# Patient Record
Sex: FEMALE | Race: WHITE | Employment: OTHER | URBAN - NONMETROPOLITAN AREA
[De-identification: names, ages, dates, MRNs, and addresses within clinical notes are randomized per-mention and may not be internally consistent; named-entity substitution may affect disease eponyms.]

---

## 2021-01-04 NOTE — PROGRESS NOTES
[facility] initial consultation note    Nivia Aguayo                                             Chief Complaint:Patient is here for Sleep consult, referred Beverly Betancourt, prior studies 2008, on pap     Chief complaint: Nivia Aguayo is a 77 y. o.oldfemale came for further evaluation regarding her sleep apnea  with referral from Ms. Alcira Baxter/ Dr. Chino Moreno    She was enrolled into Medicare 2 years back. See need BiPAP supplies. She need to see a sleep physician to get her supplies per her DME company. [place]:    Sleep/Wake schedule:  Usual time to go to bed during the work/regular day of week: 5:00 AM.  Usual time to wake up during the work//regular day of week: 4 to 8:00 P. M  She is working night shift in the past.  She is currently retired and staying at home. She is to sleep in the above sleep schedule throughout her life even during childhood. Over the weekends her sleep schedule: [x] Remain same. She usually falls a sleep in less than: 60minutes. She takes naps: Yes. Number of naps per week:  4 times. During each nap she spends a total of: 2hours. She is using her BiPAP device during her naps. The naps were reported as refreshing: No.     Sleep Hygiene:    Is the temperature and evironment in her bed room is acceptable to her: Yes. She watches Television in her bed room: No.  She read books, study, pay bills etc in the bed: Yes. She listen to books on tape. Frequency She wake up during night/sleep: 2  Majority of nocturnal awakenings are for urination: Yes. Difficulty in falling back to sleep after nocturnal awakenings: Yes    Do you drink coffee: No.   Do you drink caffeinated beverages i.e sodas: Yes. 8 can/s per day. [employer]epsi   Do you drink tea: Yes. Rarely I.e 2 times per year. Do you drink alcoholic beverages: Yes. Very rarely. History of recreational drug use: Yes.  She is currently on medical Marijuana treatment. History of tobacco smoking:Yes. Amount of tobacco smokin.0 PPD. Years of tobacco smokin. Quit smoking: No.             Quit year:No.    Current smoker: Yes. Amount of current tobacco smokin.0 PPD    Sleep apnea symptoms:  She was diagnosed with moderately severe obstructive sleep apnea diagnosed by baseline sleep study performed on 10 January 2007 (The baseline sleep study reports were not available for me to review. ). She is currently on treatment with an auto BiPAP with a maximum IPAP of 25 cm of water and minimum EPAP of 4 cm of water with pressure support of 4 cm of water. History of headaches in the morning:No.  History of dry mouth in the morning: Yes- some times  History of palpitations during night time/nocturnal awakenings: Yes- Occasionally. History of sweating during night time/nocturnal awakenings: No    General:  History of head injury in the past: No.   History of seizures: NO.  Rest less legs syndrome symptoms:NO  History suggestive of periodic limb movements during sleep: NO  History suggestive of hypnagogic hallucinations: NO  History suggestive of hypnopompic hallucinations: NO  History suggestive of sleep talking: NO  History suggestive of sleep walking:NO  History suggestive of bruxism: NO    History suggestive of cataplexy: NO  History suggestive of sleep paralysis:NO    Family history of sleep disorders:  Family history of obstructive sleep apnea: Yes. Family member diagnosed with obstructive sleep apnea brother. Family member using PAP therapy:  Yes. Family history of Narcolepsy: No.   Family history of Rest less legs syndrome : No.     History regarding old sleep studies:  Prior history of sleep study: Yes. Please see the diagnostic data section for details. Using CPAP device: Yes.   Currently using home Oxygen: NO.       Patient considerations:  Is the patient is ambulatory: Yes  Patient is currently using: None of these Wheelchair, [employer]over or [employer]. Para/Quadriplegic: NO  Hearing deficit : NO  Claustrophobic: NO  MDD : NO  Blind: NO  Incontinent: NO  Para/Quadraplegi: NO.   Need transportation to and from Sleep Center:NO    Social History:  Social History     Tobacco Use    Smoking status: Current Every Day Smoker     Packs/day: 2.00     Years: 50.00     Pack years: 100.00     Types: Cigarettes     Start date: 1971    Smokeless tobacco: Never Used    Tobacco comment: has cut down within last month to 1 pack   Substance Use Topics    Alcohol use: Yes     Frequency: Monthly or less     Drinks per session: 10 or more     Binge frequency: Less than monthly     Comment: ocassional    Drug use: Yes     Types: Marijuana     Comment: STATES IT IS MEDICAL   . She is currently working: No.       [x] Retired.                           Past Medical History:   Diagnosis Date    Allergic rhinitis     Anxiety     CAD (coronary artery disease)     COPD (chronic obstructive pulmonary disease) (HCC)     Cough     Depression     GERD (gastroesophageal reflux disease)     Hypercholesteremia     Hyperlipidemia     Hypertension     benign    Hypothyroidism     Irritable bowel syndrome     Myalgia and myositis     Sinusitis, acute maxillary     Tobacco use disorder     Type II or unspecified type diabetes mellitus without mention of complication, not stated as uncontrolled     Unspecified sleep apnea        Past Surgical History:   Procedure Laterality Date    CHOLECYSTECTOMY      COLONOSCOPY  2019    CORONARY ARTERY BYPASS GRAFT      EGD  [phone]    TONSILLECTOMY         Allergies   Allergen Reactions    Keflex [Cephalexin] Hives    Pcn [Penicillins]     Plavix [Clopidogrel Bisulfate]     Ticlid [Ticlopidine Hydrochloride]     Bactrim [Sulfamethoxazole-Trimethoprim] Rash       Current Outpatient Medications   Medication Sig Dispense Refill    valsartan (DIOVAN) 320 MG tablet Take 320 mg by mouth daily      furosemide (LASIX) 20 MG tablet Take 20 mg by mouth Twice a Week      magnesium oxide (MAG-OX) 400 MG tablet Take 400 mg by mouth daily      vitamin B-12 (CYANOCOBALAMIN) 1000 MCG tablet Take 1,000 mcg by mouth daily      Cholecalciferol (VITAMIN D3) 1.25 MG (69275 UT) CAPS Take by mouth      medical marijuana Take by mouth as needed.  Exenatide (BYDUREON) 2 MG PEN Inject 2 mg into the skin once a week       Multiple Vitamins-Minerals (MULTIVITAMIN & MINERAL PO) Take  by mouth.  Glucosamine-Chondroit-Vit C-Mn (GLUCOSAMINE CHONDR 1500 COMPLX) CAPS Take  by mouth.  DULoxetine (CYMBALTA) 30 MG capsule Take 60 mg by mouth daily       glimepiride (AMARYL) 2 MG tablet Take 4 mg by mouth every morning (before breakfast)       metformin (GLUCOPHAGE) 500 MG tablet Take 1,000 mg by mouth 2 times daily (with meals)       rosuvastatin (CRESTOR) 20 MG tablet Take 20 mg by mouth daily.  metoprolol (TOPROL-XL) 50 MG XL tablet Take 50 mg by mouth daily.  isosorbide mononitrate (IMDUR) 60 MG CR tablet Take 60 mg by mouth daily.  Pantoprazole Sodium (PROTONIX) 40 MG PACK packet Take 1 packet by mouth daily.  levothyroxine (SYNTHROID) 137 MCG tablet Take 125 mcg by mouth daily       aspirin 325 MG tablet Take 325 mg by mouth daily.  amLODIPine (NORVASC) 10 MG tablet Take 10 mg by mouth daily      losartan (COZAAR) 50 MG tablet Take 50 mg by mouth daily.  losartan (COZAAR) 25 MG tablet Take 1 tablet by mouth daily for 180 days. 30 tablet 0    nitroGLYCERIN (NITROSTAT) 0.4 MG SL tablet Place 0.4 mg under the tongue every 5 minutes as needed. No current facility-administered medications for this visit.         Family History   Problem Relation Age of Onset    Diabetes Mother     High Blood Pressure Father     Diabetes Maternal Grandmother     Cancer Paternal Grandmother         ovarian    Colon Cancer Neg Hx     Rectal Cancer Neg Hx     Esophageal Cancer Neg Hx     Stomach Cancer Neg Hx         Review of Systems:    General/Constitutional: She lost approximately 16 pounds of weight from her last titration study performed on 8 July 2009 with normal appetit. No fever or chills. HENT: Negative. Eyes: Negative. Upper respiratory tract: No nasal stuffiness or post nasal drip. Lower respiratory tract/ lungs: Occasional cough with clear sputum production. No hemoptysis. Cardiovascular: No palpitations or chest pain. Gastrointestinal: No nausea or vomiting. Neurological: No focal neurologiacal weakness. Extremities: No edema. Musculoskeletal: No complaints. Genitourinary: No complaints. Hematological: Negative. Psychiatric/Behavioral: Negative. Skin: No itching. /64 (Site: Left Upper Arm, Position: Sitting, Cuff Size: Medium Adult)   Pulse 74   Temp 97.6 °F (36.4 °C) (Temporal)   Ht 5' 5\" (1.651 m)   Wt 169 lb (76.7 kg)   SpO2 99% Comment: r/a  BMI 28.12 kg/m²   Mallampati airway Class:IV  Neck Circumference:15.5 Inches  [place] sleepiness score 1/21/21: 6  ( On further questioning she gives a history of hypersomnia ( Excessive daytime sleepiness) with daytime sleepy attacks. No reported motor vehicle accidents due to her sleepiness). SAQLI: 43     Physical Exam   Nursing note and vitals reviewed. Constitutional: Patient appears moderately built and moderately nourished. No distress. Patient is oriented to person, place, and time. HENT:   Head: Normocephalic and atraumatic. Right Ear: External ear normal.   Left Ear: External ear normal.   Mouth/Throat: Oropharynx is clear and moist.  No oral thrush. Eyes: Conjunctivae are normal. Pupils are equal, round, and reactive to light. No scleral icterus. Neck: Neck supple. No JVD present. No tracheal deviation present. Cardiovascular: Normal rate, regular rhythm, normal heart sounds. No murmur heard. Pulmonary/Chest: Effort normal and breath sounds normal. No stridor.  No respiratory of 44 years.  -Chronic history of tobacco smoking. She still smoking 2 packs of cigarettes per day. -COPD of uncertain severity.  -Depression.  -Type 2 diabetes mellitus. .  -She is working night shift in the past.    -She is currently on treatment with medical marijuana for her chronic back pain  -Anxiety disorder. Recommendations/Plan:  -Schedule patient for nocturnal polysomnogram (Sleep study) with split night protocol at [facility]  sleep lab to diagnose sleep apnea and to get optimal pressure I.e CPAP or BiPAP to start/continue PAP therapy. Patient to follow with my clinic at [facility] sleep clinic in 6 to 8 weeks with CPAP download for further evaluation.  -I had a discussion with patient regarding avialable treatment options for her sleep disorder breathing including but not limited to CPAP titration in the sleep lab Vs.Dental appliance placement with referral to a local dentist Vs other available surgical options including Uvulopalatopharyngoplasty, maxillomandibular ostomy and tracheostomy as last option. At the end of discussion, she decided on CPAP/BiPAP/AutoSV as a treatment if she found to have obstructive sleep apnea during above test/study.  -Patient educated to quit smoking as soon as possible. Patient verbalizes understanding of adverse consequences of tobacco smoking.  -She was advised to continue current positive airway pressure therapy with current pressure settings until she had new pressure settings were available. -She advised to keep good compliance with current recommended pressure to get optimal results and clinical improvement.  -Schedule patient for chest X-ray PA and Lateral views in 1month to evaluate her lung fields due to hx of tobacco smoking. Chest Xray need to be done 2days before clinic visit.  -Schedule patient for full pulmonary function tests before clinic visit.   -Will schedule patient for follow with my pulmonary clinic or any available provider at center for pulmonary disease with above Chest Xray and PFTS to further evaluate and manage COPD in 1month.  -Follow with my clinic in 6 to 8 weeks for clinical reevaluation with review of download. -She was advised to call [employer] regarding supplies if needed.  -She was advised to practice good sleep hygiene techniques. She was provided with a hand out. -She was advised call my office for earlier appointment if needed for worsening of sleep symptoms.  -Jaleesa Mcmahon educated about my impression and plan. Patient verbalizes understanding.      -I personally reviewed updated the Past medical hx, Past surgical hx,Social hx, Family hx, Medications, Allergies in the discrete data section of the patient chart along with labs, Pulmonary medicine,Sleep medicine related, Pathological, Microbiological and Radiological investigations.

## 2021-01-21 ENCOUNTER — INITIAL CONSULT (OUTPATIENT)
Dept: PULMONOLOGY | Age: 66
End: 2021-01-21
Payer: MEDICARE

## 2021-01-21 VITALS
BODY MASS INDEX: 28.16 KG/M2 | WEIGHT: 169 LBS | HEART RATE: 74 BPM | OXYGEN SATURATION: 99 % | HEIGHT: 65 IN | SYSTOLIC BLOOD PRESSURE: 116 MMHG | TEMPERATURE: 97.6 F | DIASTOLIC BLOOD PRESSURE: 64 MMHG

## 2021-01-21 DIAGNOSIS — G47.30 SLEEP APNEA, UNSPECIFIED TYPE: ICD-10-CM

## 2021-01-21 DIAGNOSIS — I25.10 CORONARY ARTERY DISEASE INVOLVING NATIVE CORONARY ARTERY OF NATIVE HEART WITHOUT ANGINA PECTORIS: ICD-10-CM

## 2021-01-21 DIAGNOSIS — Z72.0 TOBACCO ABUSE: Primary | ICD-10-CM

## 2021-01-21 DIAGNOSIS — R06.09 EXERTIONAL DYSPNEA: ICD-10-CM

## 2021-01-21 DIAGNOSIS — I10 ESSENTIAL HYPERTENSION: ICD-10-CM

## 2021-01-21 PROCEDURE — 3017F COLORECTAL CA SCREEN DOC REV: CPT | Performed by: INTERNAL MEDICINE

## 2021-01-21 PROCEDURE — G8427 DOCREV CUR MEDS BY ELIG CLIN: HCPCS | Performed by: INTERNAL MEDICINE

## 2021-01-21 PROCEDURE — G8484 FLU IMMUNIZE NO ADMIN: HCPCS | Performed by: INTERNAL MEDICINE

## 2021-01-21 PROCEDURE — G8400 PT W/DXA NO RESULTS DOC: HCPCS | Performed by: INTERNAL MEDICINE

## 2021-01-21 PROCEDURE — G8417 CALC BMI ABV UP PARAM F/U: HCPCS | Performed by: INTERNAL MEDICINE

## 2021-01-21 PROCEDURE — 4040F PNEUMOC VAC/ADMIN/RCVD: CPT | Performed by: INTERNAL MEDICINE

## 2021-01-21 PROCEDURE — 1090F PRES/ABSN URINE INCON ASSESS: CPT | Performed by: INTERNAL MEDICINE

## 2021-01-21 PROCEDURE — 99204 OFFICE O/P NEW MOD 45 MIN: CPT | Performed by: INTERNAL MEDICINE

## 2021-01-21 PROCEDURE — 4004F PT TOBACCO SCREEN RCVD TLK: CPT | Performed by: INTERNAL MEDICINE

## 2021-01-21 PROCEDURE — 1123F ACP DISCUSS/DSCN MKR DOCD: CPT | Performed by: INTERNAL MEDICINE

## 2021-01-21 RX ORDER — VALSARTAN 320 MG/1
320 TABLET ORAL DAILY
COMMUNITY

## 2021-01-21 RX ORDER — CHOLECALCIFEROL (VITAMIN D3) 1250 MCG
CAPSULE ORAL
COMMUNITY
End: 2022-03-11

## 2021-01-21 RX ORDER — LANOLIN ALCOHOL/MO/W.PET/CERES
1000 CREAM (GRAM) TOPICAL DAILY
COMMUNITY
End: 2022-03-11

## 2021-01-21 RX ORDER — FUROSEMIDE 20 MG/1
20 TABLET ORAL
COMMUNITY
End: 2022-03-11

## 2021-01-21 RX ORDER — MAGNESIUM OXIDE 400 MG/1
400 TABLET ORAL DAILY
COMMUNITY

## 2021-01-21 SDOH — HEALTH STABILITY: MENTAL HEALTH: HOW MANY STANDARD DRINKS CONTAINING ALCOHOL DO YOU HAVE ON A TYPICAL DAY?: 10 OR MORE

## 2021-01-21 NOTE — PATIENT INSTRUCTIONS
Patient Education        Learning About Benefits From Quitting Smoking  How does quitting smoking make you healthier? If you're thinking about quitting smoking, you may have a few reasons to be smoke-free. Your health may be one of them. · When you quit smoking, you lower your risks for cancer, lung disease, heart attack, stroke, blood vessel disease, and blindness from macular degeneration. · When you're smoke-free, you get sick less often, and you heal faster. You are less likely to get colds, flu, bronchitis, and pneumonia. · As a nonsmoker, you may find that your mood is better and you are less stressed. When and how will you feel healthier? Quitting has real health benefits that start from day 1 of being smoke-free. And the longer you stay smoke-free, the healthier you get and the better you feel. The first hours  · After just 20 minutes, your blood pressure and heart rate go down. That means there's less stress on your heart and blood vessels. · Within 12 hours, the level of carbon monoxide in your blood drops back to normal. That makes room for more oxygen. With more oxygen in your body, you may notice that you have more energy than when you smoked. After 2 weeks  · Your lungs start to work better. · Your risk of heart attack starts to drop. After 1 month  · When your lungs are clear, you cough less and breathe deeper, so it's easier to be active. · Your sense of taste and smell return. That means you can enjoy food more than you have since you started smoking. Over the years  · Over the years, your risks of heart disease, heart attack, and stroke are lower. · After 10 years, your risk of dying from lung cancer is cut by about half. And your risk for many other types of cancer is lower too. How would quitting help others in your life? When you quit smoking, you improve the health of everyone who now breathes in your smoke.   · Their heart, lung, and cancer risks drop, much like yours.  · They are sick less. For babies and small children, living smoke-free means they're less likely to have ear infections, pneumonia, and bronchitis. · If you're a woman who is or will be pregnant someday, quitting smoking means a healthier . · Children who are close to you are less likely to become adult smokers. Where can you learn more? Go to https://[employer] TechnologypepicewAgrar33.[employer] [facility]. org and sign in to your [employer] account. Enter [phone] in the myWebRoom box to learn more about \"Learning About Benefits From Quitting Smoking. \"     If you do not have an account, please click on the \"Sign Up Now\" link. Current as of: 2020               Content Version: 12.6  © 1550-3890 [employer]. Care instructions adapted under license by [facility] ([facility]). If you have questions about a medical condition or this instruction, always ask your healthcare professional. [place] [address] any warranty or liability for your use of this information. Recommendations/Plan:  -Schedule patient for nocturnal polysomnogram (Sleep study) with split night protocol at [facility]  sleep lab to diagnose sleep apnea and to get optimal pressure I.e CPAP or BiPAP to start/continue PAP therapy. Patient to follow with my clinic at [facility] sleep clinic in 6 to 8 weeks with CPAP download for further evaluation.  -I had a discussion with patient regarding avialable treatment options for her sleep disorder breathing including but not limited to CPAP titration in the sleep lab Vs.Dental appliance placement with referral to a local dentist Vs other available surgical options including Uvulopalatopharyngoplasty, maxillomandibular ostomy and tracheostomy as last option.  At the end of discussion, she decided on CPAP/BiPAP/AutoSV as a treatment if she found to have obstructive sleep apnea during above test/study.  -Patient educated to quit smoking as soon as possible. Patient verbalizes understanding of adverse consequences of tobacco smoking.  -She was advised to continue current positive airway pressure therapy with current pressure settings until she had new pressure settings were available. -She advised to keep good compliance with current recommended pressure to get optimal results and clinical improvement.  -Schedule patient for chest X-ray PA and Lateral views in 1month to evaluate her lung fields due to hx of tobacco smoking. Chest Xray need to be done 2days before clinic visit.  -Schedule patient for full pulmonary function tests before clinic visit. -Will schedule patient for follow with my pulmonary clinic or any available provider at center for pulmonary disease with above Chest Xray and PFTS to further evaluate and manage COPD in 1month.  -Follow with my clinic in 6 to 8 weeks for clinical reevaluation with review of download. -She was advised to call [employer] regarding supplies if needed.  -She was advised to practice good sleep hygiene techniques. She was provided with a hand out. -She was advised call my office for earlier appointment if needed for worsening of sleep symptoms.  -Kash Wilson educated about my impression and plan. Patient verbalizes understanding.

## 2021-02-18 NOTE — PROGRESS NOTES
[facility] [address]  Pulmonary medicine clinic follow up note Cheryl Cook is an established patient of my sleep medicine clinic at [facility]. She came for Pulmonary medicine evaluation today. She was seen by me for the last time on 2021      Patient: Jaleesa Mcmahon  : 1955      Chief complaint/[place]:  Jaleesa Mcmahon is a 77 y.o. old female came for further evaluation regarding her dyspnea with after having a requested chest x-ray PA and lateral views and full pulmonary function tests. She is having shortness of breath: Yes  Onset: gradual   Duration: few years  Diurnal variation:  None. Current functional capacity on level ground:<1 block on level ground. She can climb steps: Yes  Flights of steps she can climb: 1  She denies orthopnea. She denies paroxysmal nocturnal dyspnea. She is currently using BiPAP machine at nighttime. She is having cough: Yes  Duration of cough: for last 20years . Her cough is associated with sputum production: Yes   The sputum color: white  Hemoptysis:No  Diurnal variation: None. She is having chest pain:Yes- rarely. She follows with Dr. Jayda Alfred MD-her cardiologist.  She takes nitro tablets on as needed basis for her chest pain sublingually. He denies any chest pain    She is currently not using any oxygen supplementation at rest, exercise or during sleep/at night time. She was never diagnosed with pulmonary diseases I.e bronchial asthma, COPD,Pulmonary fibrosis, Sarcoidosis, pulmonary embolism,pulmonary hypertension or pleural effusion/s in the past.    She denies any hemoptysis. She was never diagnosed with pulmonary tuberculosis in the past. She denies any recent exposure to any patients with tuberculosis. She denies any recent travel to endemic places of tuberculosis.      Wells Score:    Sign/Symptom:                  Score:    Symptoms of  DVT :    0.       (3) Medical History:   Diagnosis Date    Allergic rhinitis     Anxiety     CAD (coronary artery disease)     COPD (chronic obstructive pulmonary disease) (HCC)     Cough     Depression     GERD (gastroesophageal reflux disease)     Hypercholesteremia     Hyperlipidemia     Hypertension     benign    Hypothyroidism     Irritable bowel syndrome     Myalgia and myositis     Sinusitis, acute maxillary     Tobacco use disorder     Type II or unspecified type diabetes mellitus without mention of complication, not stated as uncontrolled     Unspecified sleep apnea        Past Surgical History:   Procedure Laterality Date    CHOLECYSTECTOMY      COLONOSCOPY  2019    CORONARY ARTERY BYPASS GRAFT      EGD  [id number]    TONSILLECTOMY         Social History     Tobacco Use    Smoking status: Current Every Day Smoker     Packs/day: 2.00     Years: 50.00     Pack years: 100.00     Types: Cigarettes     Start date: 24    Smokeless tobacco: Never Used    Tobacco comment: has cut down within last month to 1 pack   Substance Use Topics    Alcohol use: Yes     Frequency: Monthly or less     Drinks per session: 10 or more     Binge frequency: Less than monthly     Comment: ocassional    Drug use: Yes     Types: Marijuana     Comment: STATES IT IS MEDICAL       Allergies   Allergen Reactions    Keflex [Cephalexin] Hives    Pcn [Penicillins]     Plavix [Clopidogrel Bisulfate]     Ticlid [Ticlopidine Hydrochloride]     Bactrim [Sulfamethoxazole-Trimethoprim] Rash       Current Outpatient Medications   Medication Sig Dispense Refill    CEPHALEXIN PO Take by mouth      valsartan (DIOVAN) 320 MG tablet Take 320 mg by mouth daily      furosemide (LASIX) 20 MG tablet Take 20 mg by mouth Twice a Week      magnesium oxide (MAG-OX) 400 MG tablet Take 400 mg by mouth daily      vitamin B-12 (CYANOCOBALAMIN) 1000 MCG tablet Take 1,000 mcg by mouth daily      Cholecalciferol (VITAMIN D3) 1.25 MG (17231 UT) CAPS airway Class:IV  Neck Circumference:15.5 Inches    Nursing note and vitals reviewed. Constitutional: Patient appears moderately built and moderately nourished. No distress. Patient is oriented to person, place, and time. HENT:   Head: Normocephalic and atraumatic. Right Ear: External ear normal.   Left Ear: External ear normal.   Mouth/Throat: Oropharynx is clear and moist.  No oral thrush. Eyes: Conjunctivae are normal. Pupils are equal, round, and reactive to light. No scleral icterus. Neck: Neck supple. No JVD present. No tracheal deviation present. Cardiovascular: Normal rate, regular rhythm, normal heart sounds. No murmur heard. Pulmonary/Chest: Effort normal and breath sounds normal. No stridor. No respiratory distress. No wheezes. No rales. Patient exhibits no tenderness. Abdominal: Soft. Patient exhibits no distension. No tenderness. Musculoskeletal: Normal range of motion. Extremities: Patient exhibits no edema and no tenderness. Lymphadenopathy:  No cervical adenopathy. Neurological: Patient is alert and oriented to person, place, and time. Skin: Skin is warm and dry. Patient is not diaphoretic. Psychiatric: Patient  has a normal mood and affect. Patient behavior is normal.       Diagnostic Data:    Sleep test: Performed on 1/10/2007  Not available for me to review. CPAP titration study: 07/8/2009            Down load in 2011:      Diagnostic Data: PSG/CPAP: 7/8/2009  PAP Download: [place]   Recorded compliance dates:12.21.20-1.19.21  More than 4hour usage compliance was:100%. Average residual Apnea- Hypoapnea index on current pressue was:4.5.       PAP Type VAuto   Level  25-4-4cmH2O     Average usuage hours per day was:10hrs 30min       Interface: ffm      Provider:  []?[facility]             [x]? [facility]                        []?[facility]          []? [facility]                           []?[facility]      []? Other:     95th percentile leaks: 10.8 L/min.     Split-night sleep study performed on 5 February 2021 at [facility] sleep lab:                          Chest Xray: 03/2/2021      The above radiological study films were reviewed by me. PFTS: 03/2/2021          Split-night sleep study performed at [facility] hospital: 2/5/2021                                                    Assessment:  --Exertional dyspnea due to ? Etiology. Differential includes Pulmonary Vs Cardiac.  -Coronary artery disease status post multiple stents in place  -Moderately severe obstructive sleep apnea. She is currently on treatment with the BiPAP pressure of 17/11 centimeters of water.  -Hypersomnia ( Excessive daytime sleepiness) may be due to obstructive sleep apnea Vs Inadequate sleep hygiene Vs her Medical Marijuana.  -Inadequate sleep hygiene.   -Status post CABG at the age of 38 years.  -Chronic history of tobacco smoking. She still smoking 2 packs of cigarettes per day. -Depression.  -Type 2 diabetes mellitus. .  -She is working night shift in the past.    -She is currently on treatment with medical marijuana for her chronic back pain  -Anxiety disorder. Recommendations/Plan:  -Patient educated to quit smoking as soon as possible. Patient verbalizes understanding of adverse consequences of tobacco smoking.  -Advised to keep her scheduled follow-up with Dr. Anatoliy Najera MD regarding her carotid artery disease and intermittent chest pain management.  -Will send serum D- dimer today to evaluate for etiology of exertional dyspnea. She was advised and instructed to call my office in [facility] to go over the above test results and management.  She verbalizes understanding.  -Schedule patient for Methacholine challenge test before clinic visit  to further evaluate for hyperreactive air way disease before clinic visit- to be done at [address] [place] [address].  -Schedule patient for low dose CT scan of chest with out IV contrast as recommended by the  U.S. Preventive Services Task Force and the NCCN for lung Cancer Screening as soon as possible. - Schedule patient for follow up with my clinic in 1month with recommended test/s Low dose CT chest and MCCT test. Patient advised to make early appointment if needed.     -She was advised to continue current positive airway pressure therapy with current pressure settings until she had new pressure settings were available. -She advised to keep good compliance with current recommended pressure to get optimal results and clinical improvement.  -Ashley Pedraza was advised  to keep her scheduled follow up at [facility] for pulmonary disease) sleep clinic for further evaluation and management of sleep apnea with down load. -She was advised to call [employer] regarding supplies if needed.  -She was advised to practice good sleep hygiene techniques. She was provided with a hand out. -She was advised call my office for earlier appointment if needed for worsening of sleep symptoms.  -She was advised to continue to practice good sleep hygiene practices.  -She was advised to loose weight by controlling diet and doing exercise once cleared by her family physician.   -Ashley Pedraza educated about my impression and plan. Patient verbalizes understanding.      -I personally reviewed updated the Past medical hx, Past surgical hx,Social hx, Family hx, Medications, Allergies in the discrete data section of the patient chart along with labs, Pulmonary medicine,Sleep medicine related, Pathological, Microbiological and Radiological investigations. Low Dose CT (LDCT) Lung Screening criteria met   Age 50-69   Pack year smoking >30   Still smoking or less than 15 year since quit   No sign or symptoms of lung cancer   > 11 months since last LDCT     Risks and benefits of lung cancer screening with LDCT scans discussed:    Significance of positive screen - False-positive LDCT results often occur. 95% of all positive results do not lead to a diagnosis of cancer.  Usually further imaging can resolve most false-positive results; however, some patients may require invasive procedures. Over diagnosis risk - 10% to 12% of screen-detected lung cancer cases are over diagnosed--that is, the cancer would not have been detected in the patient's lifetime without the screening. Need for follow up screens annually to continue lung cancer screening effectiveness     Risks associated with radiation from annual LDCT- Radiation exposure is about the same as for a mammogram, which is about 1/3 of the annual background radiation exposure from everyday life. Starting screening at age 54 is not likely to increase cancer risk from radiation exposure. Patients with comorbidities resulting in life expectancy of < 10 years, or that would preclude treatment of an abnormality identified on CT, should not be screened due to lack of benefit. To obtain maximal benefit from this screening, smoking cessation and long-term abstinence from smoking is critical    Addendum done on 3/11/21 at 7:00 PM EST:  I was notified by my office that the patient D-dimer sent today on 11 March 2021 was a 0.77 ( the normal levels are 0.19 to 0.52 mg/L)-elevated. Plan:  -Please schedule patient for CTA (CT angiogram of chest ) with IV contrast on stat basis to evaluate for Pulmonary embolism due to elevated D- dimer. She need to be kept in radiology dept until the test results were available. She can be discharged if the CTA of chest is negative for pulmonary embolism. How ever if the CTA of chest is positive for pulmonary embolism, she need to be kept in radiology dept and the results need to be called to me or our office.  -We will cancel a low-dose CT scan of chest ordered today.  -Please keep current scheduled appointment with my clinic.

## 2021-03-03 DIAGNOSIS — G47.30 SLEEP APNEA, UNSPECIFIED TYPE: ICD-10-CM

## 2021-03-03 DIAGNOSIS — I25.10 CORONARY ARTERY DISEASE INVOLVING NATIVE CORONARY ARTERY OF NATIVE HEART WITHOUT ANGINA PECTORIS: ICD-10-CM

## 2021-03-03 DIAGNOSIS — Z72.0 TOBACCO ABUSE: ICD-10-CM

## 2021-03-03 DIAGNOSIS — R06.09 EXERTIONAL DYSPNEA: ICD-10-CM

## 2021-03-03 DIAGNOSIS — I10 ESSENTIAL HYPERTENSION: ICD-10-CM

## 2021-03-08 DIAGNOSIS — R06.09 EXERTIONAL DYSPNEA: ICD-10-CM

## 2021-03-08 DIAGNOSIS — I10 ESSENTIAL HYPERTENSION: ICD-10-CM

## 2021-03-08 DIAGNOSIS — G47.30 SLEEP APNEA, UNSPECIFIED TYPE: ICD-10-CM

## 2021-03-08 DIAGNOSIS — Z72.0 TOBACCO ABUSE: ICD-10-CM

## 2021-03-08 DIAGNOSIS — I25.10 CORONARY ARTERY DISEASE INVOLVING NATIVE CORONARY ARTERY OF NATIVE HEART WITHOUT ANGINA PECTORIS: ICD-10-CM

## 2021-03-11 ENCOUNTER — OFFICE VISIT (OUTPATIENT)
Dept: PULMONOLOGY | Age: 66
End: 2021-03-11
Payer: MEDICARE

## 2021-03-11 VITALS
HEIGHT: 65 IN | HEART RATE: 64 BPM | DIASTOLIC BLOOD PRESSURE: 84 MMHG | OXYGEN SATURATION: 97 % | BODY MASS INDEX: 29.79 KG/M2 | SYSTOLIC BLOOD PRESSURE: 138 MMHG | WEIGHT: 178.8 LBS | TEMPERATURE: 97.8 F

## 2021-03-11 DIAGNOSIS — R06.09 DYSPNEA ON EXERTION: ICD-10-CM

## 2021-03-11 DIAGNOSIS — Z87.891 PERSONAL HISTORY OF TOBACCO USE: ICD-10-CM

## 2021-03-11 DIAGNOSIS — Z87.891 PERSONAL HISTORY OF TOBACCO USE, PRESENTING HAZARDS TO HEALTH: Primary | ICD-10-CM

## 2021-03-11 DIAGNOSIS — R06.00 DYSPNEA, UNSPECIFIED TYPE: Primary | ICD-10-CM

## 2021-03-11 DIAGNOSIS — R79.89 ELEVATED D-DIMER: ICD-10-CM

## 2021-03-11 DIAGNOSIS — Z87.891 PERSONAL HISTORY OF TOBACCO USE, PRESENTING HAZARDS TO HEALTH: ICD-10-CM

## 2021-03-11 PROCEDURE — 1123F ACP DISCUSS/DSCN MKR DOCD: CPT | Performed by: INTERNAL MEDICINE

## 2021-03-11 PROCEDURE — 3017F COLORECTAL CA SCREEN DOC REV: CPT | Performed by: INTERNAL MEDICINE

## 2021-03-11 PROCEDURE — 1090F PRES/ABSN URINE INCON ASSESS: CPT | Performed by: INTERNAL MEDICINE

## 2021-03-11 PROCEDURE — 99214 OFFICE O/P EST MOD 30 MIN: CPT | Performed by: INTERNAL MEDICINE

## 2021-03-11 PROCEDURE — 4004F PT TOBACCO SCREEN RCVD TLK: CPT | Performed by: INTERNAL MEDICINE

## 2021-03-11 PROCEDURE — G8484 FLU IMMUNIZE NO ADMIN: HCPCS | Performed by: INTERNAL MEDICINE

## 2021-03-11 PROCEDURE — 4040F PNEUMOC VAC/ADMIN/RCVD: CPT | Performed by: INTERNAL MEDICINE

## 2021-03-11 PROCEDURE — G8417 CALC BMI ABV UP PARAM F/U: HCPCS | Performed by: INTERNAL MEDICINE

## 2021-03-11 PROCEDURE — G8400 PT W/DXA NO RESULTS DOC: HCPCS | Performed by: INTERNAL MEDICINE

## 2021-03-11 PROCEDURE — G0296 VISIT TO DETERM LDCT ELIG: HCPCS | Performed by: INTERNAL MEDICINE

## 2021-03-11 PROCEDURE — G8428 CUR MEDS NOT DOCUMENT: HCPCS | Performed by: INTERNAL MEDICINE

## 2021-03-11 NOTE — PATIENT INSTRUCTIONS
What is lung cancer screening? Lung cancer screening is a way in which doctors check the lungs for early signs of cancer in people who have no symptoms of lung cancer. A low-dose CT scan uses much less radiation than a normal CT scan and shows a more detailed image of the lungs than a standard X-ray. The goal of lung cancer screening is to find cancer early, before it has a chance to grow, spread, or cause problems. One large study found that smokers who were screened with low-dose CT scans were less likely to die of lung cancer than those who were screened with standard X-ray. Below is a summary of the things you need to know regarding screening for lung cancer with low-dose computed tomography (LDCT). This is a screening program that involves routine annual screening with LDCT studies of the lung. The LDCTs are done using low-dose radiation that is not thought to increase your cancer risk. If you have other serious medical conditions (other cancers, congestive heart failure) that limit your life expectancy to less than 10 years, you should not undergo lung cancer screening with LDCT. The chance is 20%-60% that the LDCT result will show abnormalities. This would require additional testing which could include repeat imaging or even invasive procedures. Most (about 95%) of \"abnormal\" LDCT results are false in the sense that no lung cancer is ultimately found. Additionally, some (about 10%) of the cancers found would not affect your life expectancy, even if undetected and untreated. If you are still smoking, the single most important thing that you can do to reduce your risk of dying of lung cancer is to quit. For this screening to be covered by Medicare and most other insurers, strict criteria must be met.   If you do not meet these criteria, but still wish to undergo LDCT testing, you will be required to sign a waiver indicating your willingness to pay for the scan.      Recommendations/Plan:  -Patient educated to quit smoking as soon as possible. Patient verbalizes understanding of adverse consequences of tobacco smoking.  -Advised to keep her scheduled follow-up with Dr. Iftikhar Sotomayor MD regarding her carotid artery disease and intermittent chest pain management.  -Will send serum D- dimer today to evaluate for etiology of exertional dyspnea. She was advised and instructed to call my office in [facility] to go over the above test results and management. She verbalizes understanding.  -Schedule patient for Methacholine challenge test before clinic visit  to further evaluate for hyperreactive air way disease before clinic visit- [facility]  -Schedule patient for low dose CT scan of chest with out IV contrast as recommended by the  U.S. Preventive Services Task Force and the NCCN for lung Cancer Screening as soon as possible. - Schedule patient for follow up with my clinic in 1month with recommended test/s Low dose CT chest and MCCT test. Patient advised to make early appointment if needed.     -She was advised to continue current positive airway pressure therapy with current pressure settings until she had new pressure settings were available. -She advised to keep good compliance with current recommended pressure to get optimal results and clinical improvement.  -Peewee Palmer was advised  to keep her scheduled follow up at [facility] for pulmonary disease) sleep clinic for further evaluation and management of sleep apnea with down load. -She was advised to call [employer] regarding supplies if needed.  -She was advised to practice good sleep hygiene techniques. She was provided with a hand out.   -She was advised call my office for earlier appointment if needed for worsening of sleep symptoms.  -She was advised to continue to practice good sleep hygiene practices.  -She was advised to loose weight by controlling diet and doing exercise once cleared by her family physician.   -Keaton Gamboa educated about my impression and plan. Patient verbalizes understanding.

## 2021-03-12 ENCOUNTER — TELEPHONE (OUTPATIENT)
Dept: PULMONOLOGY | Age: 66
End: 2021-03-12

## 2021-03-12 ENCOUNTER — HOSPITAL ENCOUNTER (OUTPATIENT)
Dept: CT IMAGING | Age: 66
Discharge: HOME | End: 2021-03-12
Payer: MEDICARE

## 2021-03-12 DIAGNOSIS — R79.89 ELEVATED D-DIMER: ICD-10-CM

## 2021-03-12 DIAGNOSIS — R06.00 DYSPNEA, UNSPECIFIED TYPE: ICD-10-CM

## 2021-03-12 LAB — POC CREATININE WHOLE BLOOD: 0.8 MG/DL (ref 0.5–1.2)

## 2021-03-12 PROCEDURE — 6360000004 HC RX CONTRAST MEDICATION: Performed by: INTERNAL MEDICINE

## 2021-03-12 PROCEDURE — 71275 CT ANGIOGRAPHY CHEST: CPT

## 2021-03-12 PROCEDURE — 82565 ASSAY OF CREATININE: CPT

## 2021-03-12 RX ADMIN — IOPAMIDOL 80 ML: 755 INJECTION, SOLUTION INTRAVENOUS at 11:47

## 2021-03-12 NOTE — TELEPHONE ENCOUNTER
Called Pts  to let him know that she needs a CTA today. Called both phones multiple times this morning but they were busy. LM on Pts phone also. She called back and they will go to  by 1230 for the CTA. Silke Damon  talked to Griffin at radiology and they will change the order to CTA w/wo

## 2021-04-07 NOTE — PROGRESS NOTES
[facility] [address]  Pulmonary medicine clinic follow up note     Patient: Eliot Dodson  : 1955      Chief complaint/[place]:  Eliot Dodson is a 77 y.o. old female came for follow up regarding her dyspnea with after having serum D-dimer and methacholine challenge test.  She was found to have an elevated serum D-dimer. She subsequently underwent CT angiogram of chest with contrast for further evaluation of elevated serum D-dimer and found to be negative for pulmonary embolism. She also underwent methacholine challenge test and found to be positive. She is having shortness of breath: Yes  Onset: gradual   Duration: few years  Diurnal variation:  None. Current functional capacity on level ground:<1 block on level ground. She can climb steps: Yes  Flights of steps she can climb: 1  She denies orthopnea. She denies paroxysmal nocturnal dyspnea. She is currently using BiPAP machine at nighttime. She is having cough: Yes  Duration of cough: for last 20years . Her cough is associated with sputum production: Yes   The sputum color: white  Hemoptysis:No  Diurnal variation: None. She is having chest pain:Yes- rarely. She follows with Dr. Eb Last MD-her cardiologist.  She takes nitro tablets on as needed basis for her chest pain sublingually. He denies any chest pain    She is currently not using any oxygen supplementation at rest, exercise or during sleep/at night time. 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 History of tobacco smoking:Yes  Amount of tobacco smokin.0 PPD. Years of tobacco smokin. Quit smoking: No.              Current smoker: Yes. Amount of current tobacco smokin.0 PPD  . History of recreational or IV drug use in the past:NO     History of exposure to coal mines/coal dust: NO  History of exposure to foundry dust/welding: NO  History of exposure to quarry/silica/sandblasting: NO  History of exposure to asbestos/working with breaks/ships: NO  History of exposure to farm dust: NO  History of recent travel to long distances: NO  History of exposure to birds, pigeons, or chickens in the past:NO  Pet animals at home: Yes  Dogs: 1    History of pulmonary embolism in the past: No            History of DVT in the past:No                             Review of Systems:   General/Constitutional: she lost 1lb of weight from the last visit. Please see HPI regarding appetite,fever and chills. HENT: Negative. Eyes: Negative. Upper respiratory tract: No nasal stuffiness or post nasal drip. Lower respiratory tract/ lungs: See HPI section. .  Cardiovascular: No palpitations or chest pain. Gastrointestinal: No nausea or vomiting. Neurological: No focal neurologiacal weakness. Extremities: No edema. Musculoskeletal: No complaints. Genitourinary: No complaints. Hematological: Negative. Psychiatric/Behavioral: Negative. Skin: No itching.       Past Medical History:   Diagnosis Date    Allergic rhinitis     Anxiety     CAD (coronary artery disease)     COPD (chronic obstructive pulmonary disease) (HCC)     Cough     Depression     GERD (gastroesophageal reflux disease)     Hypercholesteremia     Hyperlipidemia     Hypertension     benign    Hypothyroidism     Irritable bowel syndrome     Myalgia and myositis     Sinusitis, acute maxillary     Tobacco use disorder     Type II or unspecified type diabetes mellitus without mention of complication, not stated as uncontrolled     Unspecified sleep apnea        Past Surgical History:   Procedure Laterality Date    CHOLECYSTECTOMY      COLONOSCOPY  2019    CORONARY ARTERY BYPASS GRAFT      EGD  [id number]    TONSILLECTOMY         Social History     Tobacco Use    Smoking status: Current Every Day Smoker     Packs/day: 2.00     Years: 50.00     Pack years: 100.00     Types: Cigarettes     Start date: 24    Smokeless tobacco: Never Used    Tobacco comment: has cut down within last month to 1 pack   Substance Use Topics    Alcohol use: Yes     Frequency: Monthly or less     Drinks per session: 10 or more     Binge frequency: Less than monthly     Comment: ocassional    Drug use: Yes     Types: Marijuana     Comment: STATES IT IS MEDICAL       Allergies   Allergen Reactions    Keflex [Cephalexin] Hives    Pcn [Penicillins]     Plavix [Clopidogrel Bisulfate]     Ticlid [Ticlopidine Hydrochloride]     Bactrim [Sulfamethoxazole-Trimethoprim] Rash       Current Outpatient Medications   Medication Sig Dispense Refill    fluticasone (FLOVENT HFA) 110 MCG/ACT inhaler Inhale 2 puffs into the lungs 2 times daily Rinse mouth after its use. 1 Inhaler 11    albuterol sulfate HFA (VENTOLIN HFA) 108 (90 Base) MCG/ACT inhaler Inhale 2 puffs into the lungs 4 times daily as needed for Wheezing 1 Inhaler 11    CPAP Machine MISC by Does not apply route Please add pressure support of 4 cm H2o 1 each 0    CEPHALEXIN PO Take by mouth      valsartan (DIOVAN) 320 MG tablet Take 320 mg by mouth daily      furosemide (LASIX) 20 MG tablet Take 20 mg by mouth Twice a Week      magnesium oxide (MAG-OX) 400 MG tablet Take 400 mg by mouth daily      vitamin B-12 (CYANOCOBALAMIN) 1000 MCG tablet Take 1,000 mcg by mouth daily      Cholecalciferol (VITAMIN D3) 1.25 MG (15053 UT) CAPS Take by mouth      medical marijuana Take by mouth as needed.       Exenatide (BYDUREON) 2 MG PEN Inject 2 mg into the skin once a week       amLODIPine (NORVASC) 10 MG tablet Take 10 mg by mouth daily      Multiple Vitamins-Minerals (MULTIVITAMIN & MINERAL PO) Take  by mouth.  Glucosamine-Chondroit-Vit C-Mn (GLUCOSAMINE CHONDR 1500 COMPLX) CAPS Take  by mouth.  losartan (COZAAR) 50 MG tablet Take 50 mg by mouth daily.  DULoxetine (CYMBALTA) 30 MG capsule Take 60 mg by mouth daily       glimepiride (AMARYL) 2 MG tablet Take 4 mg by mouth every morning (before breakfast)       metformin (GLUCOPHAGE) 500 MG tablet Take 1,000 mg by mouth 2 times daily (with meals)       rosuvastatin (CRESTOR) 20 MG tablet Take 20 mg by mouth daily.  metoprolol (TOPROL-XL) 50 MG XL tablet Take 50 mg by mouth daily.  isosorbide mononitrate (IMDUR) 60 MG CR tablet Take 60 mg by mouth daily.  Pantoprazole Sodium (PROTONIX) 40 MG PACK packet Take 1 packet by mouth daily.  nitroGLYCERIN (NITROSTAT) 0.4 MG SL tablet Place 0.4 mg under the tongue every 5 minutes as needed.  levothyroxine (SYNTHROID) 137 MCG tablet Take 125 mcg by mouth daily       aspirin 325 MG tablet Take 325 mg by mouth daily.  losartan (COZAAR) 25 MG tablet Take 1 tablet by mouth daily for 180 days. 30 tablet 0     No current facility-administered medications for this visit. Family History   Problem Relation Age of Onset    Diabetes Mother     High Blood Pressure Father     Diabetes Maternal Grandmother     Cancer Paternal Grandmother         ovarian    Colon Cancer Neg Hx     Rectal Cancer Neg Hx     Esophageal Cancer Neg Hx     Stomach Cancer Neg Hx         /70 (Site: Left Upper Arm, Position: Sitting, Cuff Size: Medium Adult)   Pulse 65   Temp 97.8 °F (36.6 °C)   Ht 5' 5\" (1.651 m)   Wt 177 lb (80.3 kg)   SpO2 98% Comment: room air at rest  BMI 29.45 kg/m²   Mallampati airway Class:IV  Neck Circumference:15.5 Inches    Nursing note and vitals reviewed. Constitutional: Patient appears moderately built and moderately nourished. No distress. Patient is oriented to person, place, and time. HENT:   Head: Normocephalic and atraumatic. [facility] hospital: 2/5/2021                          D-dimer: 11 March 2021          CTA of chest:  Fri Mar 12, 2021 12:04 PM   PROCEDURE: CTA CHEST W WO CONTRAST   1. No PE to ascending and descending thoracic aortic aneurysms, the ascending aorta measures up to 4.2 cm at the level the right pulmonary artery. 2. Cardiomegaly. 3. Thyromegaly. MCCT test: Performed on 27 April 2021                Assessment:  -Mild persistent bronchial asthma-newly diagnosed. Late onset  -Exertional dyspnea due to ? Etiology-due to uncontrolled bronchial asthma VS Cardiac.  -Coronary artery disease status post multiple stents in place  -Moderately severe obstructive sleep apnea. She is currently on treatment with the BiPAP pressure of 17/11 centimeters of water.  -Hypersomnia ( Excessive daytime sleepiness) may be due to obstructive sleep apnea Vs Inadequate sleep hygiene Vs her Medical Marijuana-improved  -Status post CABG at the age of 38 years.  -Chronic history of tobacco smoking. She still smoking 2 packs of cigarettes per day. She still smoking cigarettes  -Depression.  -Type 2 diabetes mellitus. .  -She is working night shift in the past.    -She is currently on treatment with medical marijuana for her chronic back pain  -Anxiety disorder. Recommendations/Plan:  -Start patient on Flovent HFA 110mcg/INH, 2INH BID. She was informed about adverse effects of Flovent. She verbalizes understanding.  - Start patient on Albuterol HFA 90mcg/Spray MDI, 2puffs  Q6Hprn. She was informed about adverse effects of Albuterol HFA. She verbalizes understanding.  -Patient advised to continue current inhalers and keep good compliance with treatment. - Patient educated to update his pneumococcal vaccine with family physician and take influenza vaccine in coming season with out fail.  Patient verbalizes understanding.  -Deatra Servant educated about environmental safety precautions she need to practice to prevent exacerbation ofher Asthma. Lolita Dumont verbalizes understanding.   -Patient educated to quit smoking as soon as possible. Patient verbalizes understanding of adverse consequences of tobacco smoking.  -Advised to keep her scheduled follow-up with Dr. Sushil Blake MD regarding her carotid artery disease and intermittent chest pain management.  -Schedule patient for cardiothoracic surgery consult by Dr. Shayna Rao MD service for further evaluation of ascending and descending thoracic aortic aneurysms, the ascending aorta measures up to 4.2 cm at the level the right pulmonary artery.  -Lolita Dumont was advised  to keep her scheduled follow up at [facility] for pulmonary disease) sleep clinic with Ms. Abby Schilling CNP for further evaluation and management of sleep apnea with down load. - Schedule patient for follow up with my clinic in 3months for clinical reevaluation. Patient advised to make early appointment if needed.  -oLlita Dumont educated about my impression and plan. Patient verbalizes understanding.        -I personally reviewed updated the Past medical hx, Past surgical hx,Social hx, Family hx, Medications, Allergies in the discrete data section of the patient chart along with labs, Pulmonary medicine,Sleep medicine related, Pathological, Microbiological and Radiological investigations.

## 2021-04-14 ENCOUNTER — OFFICE VISIT (OUTPATIENT)
Dept: PULMONOLOGY | Age: 66
End: 2021-04-14
Payer: MEDICARE

## 2021-04-14 VITALS
TEMPERATURE: 97.4 F | HEART RATE: 59 BPM | BODY MASS INDEX: 30.62 KG/M2 | SYSTOLIC BLOOD PRESSURE: 138 MMHG | DIASTOLIC BLOOD PRESSURE: 80 MMHG | WEIGHT: 183.8 LBS | HEIGHT: 65 IN | OXYGEN SATURATION: 97 %

## 2021-04-14 DIAGNOSIS — G47.33 OSA TREATED WITH BIPAP: Primary | ICD-10-CM

## 2021-04-14 DIAGNOSIS — G47.8 NON-RESTORATIVE SLEEP: ICD-10-CM

## 2021-04-14 PROCEDURE — 4040F PNEUMOC VAC/ADMIN/RCVD: CPT | Performed by: NURSE PRACTITIONER

## 2021-04-14 PROCEDURE — 99214 OFFICE O/P EST MOD 30 MIN: CPT | Performed by: NURSE PRACTITIONER

## 2021-04-14 PROCEDURE — 4004F PT TOBACCO SCREEN RCVD TLK: CPT | Performed by: NURSE PRACTITIONER

## 2021-04-14 PROCEDURE — G8400 PT W/DXA NO RESULTS DOC: HCPCS | Performed by: NURSE PRACTITIONER

## 2021-04-14 PROCEDURE — G8417 CALC BMI ABV UP PARAM F/U: HCPCS | Performed by: NURSE PRACTITIONER

## 2021-04-14 PROCEDURE — 1123F ACP DISCUSS/DSCN MKR DOCD: CPT | Performed by: NURSE PRACTITIONER

## 2021-04-14 PROCEDURE — 1090F PRES/ABSN URINE INCON ASSESS: CPT | Performed by: NURSE PRACTITIONER

## 2021-04-14 PROCEDURE — G8427 DOCREV CUR MEDS BY ELIG CLIN: HCPCS | Performed by: NURSE PRACTITIONER

## 2021-04-14 PROCEDURE — 3017F COLORECTAL CA SCREEN DOC REV: CPT | Performed by: NURSE PRACTITIONER

## 2021-04-14 ASSESSMENT — ENCOUNTER SYMPTOMS
EYES NEGATIVE: 1
COUGH: 0
VOMITING: 0
SHORTNESS OF BREATH: 0
NAUSEA: 0
WHEEZING: 0
DIARRHEA: 0
ABDOMINAL PAIN: 0
BACK PAIN: 1

## 2021-04-14 NOTE — PROGRESS NOTES
Marijuana use vs. Poor sleep hygiene     Plan   Do you need any equipment today? No    - Download reviewed and discussed with patient  - She  was advised to continue current positive airway pressure therapy with above described pressure. - She  advised to keep good compliance with current recommended pressure to get optimal results and clinical improvement  -add Pressure support 4 keep 17/11 settings   - Recommend 7-9 hours of sleep with PAP  - She was advised to call [employer] regarding supplies if needed.   -She call my office for earlier appointment if needed for worsening of sleep symptoms.   - She was instructed on weight loss  -discussed sleep hygiene     - Rhys Han was educated about my impression and plan. Patient verbalizesunderstanding.     We will see Deatra Servant back in: 6 months with download    Information added by my medical assistant/LPN was reviewed today    Electronically signed by KRISTIN Almonte CNP on 4/14/2021 at 3:27 PM

## 2021-04-16 ENCOUNTER — TELEPHONE (OUTPATIENT)
Dept: PULMONOLOGY | Age: 66
End: 2021-04-16

## 2021-04-16 NOTE — TELEPHONE ENCOUNTER
Aidan Rodriguez from [address] [place] [address] called stating she cannot had a pressure support unless she is on an auto. You would just have to switch her into an auto bipap mode. Please advise.

## 2021-04-27 ENCOUNTER — HOSPITAL ENCOUNTER (OUTPATIENT)
Dept: PULMONOLOGY | Age: 66
Discharge: HOME | End: 2021-04-27
Payer: MEDICARE

## 2021-04-27 ENCOUNTER — APPOINTMENT (OUTPATIENT)
Dept: CT IMAGING | Age: 66
End: 2021-04-27
Payer: MEDICARE

## 2021-04-27 DIAGNOSIS — Z87.891 PERSONAL HISTORY OF TOBACCO USE, PRESENTING HAZARDS TO HEALTH: ICD-10-CM

## 2021-04-27 DIAGNOSIS — R06.09 DYSPNEA ON EXERTION: ICD-10-CM

## 2021-04-27 PROCEDURE — 2580000003 HC RX 258

## 2021-04-27 PROCEDURE — 94070 EVALUATION OF WHEEZING: CPT

## 2021-04-27 PROCEDURE — 6360000002 HC RX W HCPCS

## 2021-04-29 ENCOUNTER — TELEPHONE (OUTPATIENT)
Dept: PULMONOLOGY | Age: 66
End: 2021-04-29

## 2021-04-29 NOTE — TELEPHONE ENCOUNTER
Dr Andre Self from [facility] lab called and asked if you could read the Methacholine Challenge she said it was read as a PFT instead.

## 2021-05-06 ENCOUNTER — OFFICE VISIT (OUTPATIENT)
Dept: PULMONOLOGY | Age: 66
End: 2021-05-06
Payer: MEDICARE

## 2021-05-06 VITALS
TEMPERATURE: 97.8 F | DIASTOLIC BLOOD PRESSURE: 70 MMHG | HEIGHT: 65 IN | WEIGHT: 177 LBS | OXYGEN SATURATION: 98 % | SYSTOLIC BLOOD PRESSURE: 122 MMHG | HEART RATE: 65 BPM | BODY MASS INDEX: 29.49 KG/M2

## 2021-05-06 DIAGNOSIS — R93.89 ABNORMAL CT OF THE CHEST: ICD-10-CM

## 2021-05-06 DIAGNOSIS — J45.30 MILD PERSISTENT ASTHMA WITHOUT COMPLICATION: Primary | ICD-10-CM

## 2021-05-06 DIAGNOSIS — I71.20 THORACIC AORTIC ANEURYSM WITHOUT RUPTURE: ICD-10-CM

## 2021-05-06 PROCEDURE — G8417 CALC BMI ABV UP PARAM F/U: HCPCS | Performed by: INTERNAL MEDICINE

## 2021-05-06 PROCEDURE — 1123F ACP DISCUSS/DSCN MKR DOCD: CPT | Performed by: INTERNAL MEDICINE

## 2021-05-06 PROCEDURE — 4004F PT TOBACCO SCREEN RCVD TLK: CPT | Performed by: INTERNAL MEDICINE

## 2021-05-06 PROCEDURE — 3017F COLORECTAL CA SCREEN DOC REV: CPT | Performed by: INTERNAL MEDICINE

## 2021-05-06 PROCEDURE — G8427 DOCREV CUR MEDS BY ELIG CLIN: HCPCS | Performed by: INTERNAL MEDICINE

## 2021-05-06 PROCEDURE — 4040F PNEUMOC VAC/ADMIN/RCVD: CPT | Performed by: INTERNAL MEDICINE

## 2021-05-06 PROCEDURE — 1090F PRES/ABSN URINE INCON ASSESS: CPT | Performed by: INTERNAL MEDICINE

## 2021-05-06 PROCEDURE — 99214 OFFICE O/P EST MOD 30 MIN: CPT | Performed by: INTERNAL MEDICINE

## 2021-05-06 PROCEDURE — G8400 PT W/DXA NO RESULTS DOC: HCPCS | Performed by: INTERNAL MEDICINE

## 2021-05-06 RX ORDER — FLUTICASONE PROPIONATE 110 UG/1
2 AEROSOL, METERED RESPIRATORY (INHALATION) 2 TIMES DAILY
Qty: 1 INHALER | Refills: 11 | Status: SHIPPED | OUTPATIENT
Start: 2021-05-06 | End: 2021-08-05 | Stop reason: ALTCHOICE

## 2021-05-06 RX ORDER — ALBUTEROL SULFATE 90 UG/1
2 AEROSOL, METERED RESPIRATORY (INHALATION) 4 TIMES DAILY PRN
Qty: 1 INHALER | Refills: 11 | Status: SHIPPED | OUTPATIENT
Start: 2021-05-06 | End: 2022-03-11

## 2021-05-06 NOTE — PATIENT INSTRUCTIONS
Recommendations/Plan:  -Start patient on Flovent HFA 110mcg/INH, 2INH BID. She was informed about adverse effects of Flovent. She verbalizes understanding.  - Start patient on Albuterol HFA 90mcg/Spray MDI, 2puffs  Q6Hprn. She was informed about adverse effects of Albuterol HFA. She verbalizes understanding.  -Patient advised to continue current inhalers and keep good compliance with treatment. - Patient educated to update his pneumococcal vaccine with family physician and take influenza vaccine in coming season with out fail. Patient verbalizes understanding.  -Gibsonia Askew educated about environmental safety precautions she need to practice to prevent exacerbation ofher Asthma. Gibsonia Askew verbalizes understanding.   -Patient educated to quit smoking as soon as possible. Patient verbalizes understanding of adverse consequences of tobacco smoking.  -Advised to keep her scheduled follow-up with Dr. Hortensia Schrader MD regarding her carotid artery disease and intermittent chest pain management.  -Schedule patient for cardiothoracic surgery consult by Dr. Kenna Alvarez MD service for further evaluation of ascending and descending thoracic aortic aneurysms, the ascending aorta measures up to 4.2 cm at the level the right pulmonary artery. - Schedule patient for follow up with my clinic in 3months for clinical reevaluation. Patient advised to make early appointment if needed.  -Gibsonia Askew educated about my impression and plan. Patient verbalizes understanding.

## 2021-07-06 ENCOUNTER — HOSPITAL ENCOUNTER (OUTPATIENT)
Age: 66
Discharge: HOME | End: 2021-07-06
Payer: MEDICARE

## 2021-07-06 PROCEDURE — 81291 MTHFR GENE: CPT

## 2021-07-06 PROCEDURE — 85305 CLOT INHIBIT PROT S TOTAL: CPT

## 2021-07-06 PROCEDURE — 85300 ANTITHROMBIN III ACTIVITY: CPT

## 2021-07-06 PROCEDURE — 85230 CLOT FACTOR VII PROCONVERTIN: CPT

## 2021-07-06 PROCEDURE — 81241 F5 GENE: CPT

## 2021-07-06 PROCEDURE — 36415 COLL VENOUS BLD VENIPUNCTURE: CPT

## 2021-07-06 PROCEDURE — 81240 F2 GENE: CPT

## 2021-07-06 PROCEDURE — 85303 CLOT INHIBIT PROT C ACTIVITY: CPT

## 2021-07-07 NOTE — PROGRESS NOTES
[facility] [address]  Pulmonary medicine clinic follow up note     Patient: Stevenson Ruano  : 1955      Chief complaint/[place]:  Stevenson Ruano is a 77 y.o. old female came for follow up regarding her dyspnea and Mild persistent bronchial asthma. She underwent thromboendarterectomy due to her right-sided carotid artery stenosis at [facility] on 2021. She was prescribed with the Flovent HFA 2puffs twice daily along with albuterol HFA 2 puffs every 6 hours as needed at the last visit. Patient used to Flovent HFA 2 puffs every 12 hours for 2 to 3 days. She quit using her Flovent. Patient rarely using her albuterol HFA 2 puffs every 6 hours as needed. On today's questioning:  She denies cough or expectoration. She denies hemoptysis. She denies fever or chills. She denies recent hospitalizations or emergency room visits. She is currently not using any maintenance inhaler  She is using rescue inhaler/nebs rarely. She denies recent loss of weight or appetite changes. She denies recent decline in functional status. She is currently using BiPAP machine at nighttime. She is currently not using any oxygen supplementation at rest, exercise or during sleep/at night time. 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 History of tobacco smoking:Yes  Amount of tobacco smokin.0 PPD. Years of tobacco smokin. Quit smoking: No.              Current smoker: Yes. Amount of current tobacco smokin.0 PPD  .   History of recreational or IV drug use in the past:NO     History of exposure to coal mines/coal dust: NO  History of exposure to foundry dust/welding: NO  History of exposure to quarry/silica/sandblasting: NO  History of exposure to asbestos/working with breaks/ships: NO  History of exposure to farm dust: NO  History of recent travel to long distances: NO  History of exposure to birds, pigeons, or chickens in the past:NO  Pet animals at home: Yes  Dogs: 1    History of pulmonary embolism in the past: No            History of DVT in the past:No                             Review of Systems:   General/Constitutional: she gained 8 lbs of weight from the last visit. No fever or chills. HENT: Negative. Eyes: Negative. Upper respiratory tract: No nasal stuffiness or post nasal drip. Lower respiratory tract/ lungs: No cough or sputum production. Cardiovascular: No palpitations or chest pain. Gastrointestinal: No nausea or vomiting. Neurological: No focal neurologiacal weakness. Extremities: No edema. Musculoskeletal: No complaints. Genitourinary: No complaints. Hematological: Negative. Psychiatric/Behavioral: Negative. Skin: No itching.         Past Medical History:   Diagnosis Date    Allergic rhinitis     Anxiety     CAD (coronary artery disease)     COPD (chronic obstructive pulmonary disease) (HCC)     Cough     Depression     GERD (gastroesophageal reflux disease)     Hypercholesteremia     Hyperlipidemia     Hypertension     benign    Hypothyroidism     Irritable bowel syndrome     Myalgia and myositis     Sinusitis, acute maxillary     Tobacco use disorder     Type II or unspecified type diabetes mellitus without mention of complication, not stated as uncontrolled     Unspecified sleep apnea        Past Surgical History:   Procedure Laterality Date    CHOLECYSTECTOMY      COLONOSCOPY  2019    CORONARY ARTERY BYPASS GRAFT      EGD  [id number]    TONSILLECTOMY         Social History     Tobacco Use    Smoking status: Current Every Day Smoker     Packs/day: 2.00     Years: 50.00     Pack years: 100.00     Types: Cigarettes     Start date: 24    Smokeless tobacco: Never Used    Tobacco comment: has cut down within last month to 1 pack   Substance Use Topics    Alcohol use: Yes     Comment: ocassional    Drug use: Yes     Types: Marijuana     Comment: STATES IT IS MEDICAL       Allergies   Allergen Reactions    Keflex [Cephalexin] Hives    Pcn [Penicillins]     Plavix [Clopidogrel Bisulfate]     Ticlid [Ticlopidine Hydrochloride]     Bactrim [Sulfamethoxazole-Trimethoprim] Rash       Current Outpatient Medications   Medication Sig Dispense Refill    FOLIC ACID PO Take by mouth      clopidogrel (PLAVIX) 75 MG tablet Take 75 mg by mouth daily      aspirin 81 MG EC tablet Take 81 mg by mouth daily      albuterol sulfate HFA (VENTOLIN HFA) 108 (90 Base) MCG/ACT inhaler Inhale 2 puffs into the lungs 4 times daily as needed for Wheezing 1 Inhaler 11    CPAP Machine MISC by Does not apply route Please add pressure support of 4 cm H2o 1 each 0    CEPHALEXIN PO Take by mouth      valsartan (DIOVAN) 320 MG tablet Take 320 mg by mouth daily      furosemide (LASIX) 20 MG tablet Take 20 mg by mouth Twice a Week      magnesium oxide (MAG-OX) 400 MG tablet Take 400 mg by mouth daily      vitamin B-12 (CYANOCOBALAMIN) 1000 MCG tablet Take 1,000 mcg by mouth daily      Cholecalciferol (VITAMIN D3) 1.25 MG (74878 UT) CAPS Take by mouth      medical marijuana Take by mouth as needed.  Exenatide (BYDUREON) 2 MG PEN Inject 2 mg into the skin once a week       amLODIPine (NORVASC) 10 MG tablet Take 10 mg by mouth daily      Multiple Vitamins-Minerals (MULTIVITAMIN & MINERAL PO) Take  by mouth.  Glucosamine-Chondroit-Vit C-Mn (GLUCOSAMINE CHONDR 1500 COMPLX) CAPS Take  by mouth.       DULoxetine (CYMBALTA) 30 MG capsule Take 60 mg by mouth daily       glimepiride (AMARYL) 2 MG tablet Take 4 mg by mouth every morning (before breakfast)       metformin (GLUCOPHAGE) 500 MG tablet Take 1,000 mg by mouth 2 times daily (with meals)       rosuvastatin (CRESTOR) 20 MG tablet Take 20 mg by mouth daily.  metoprolol (TOPROL-XL) 50 MG XL tablet Take 12.5 mg by mouth daily       isosorbide mononitrate (IMDUR) 60 MG CR tablet Take 60 mg by mouth daily.  Pantoprazole Sodium (PROTONIX) 40 MG PACK packet Take 1 packet by mouth daily.  nitroGLYCERIN (NITROSTAT) 0.4 MG SL tablet Place 0.4 mg under the tongue every 5 minutes as needed.  levothyroxine (SYNTHROID) 137 MCG tablet Take 125 mcg by mouth daily       losartan (COZAAR) 50 MG tablet Take 50 mg by mouth daily. (Patient not taking: Reported on 8/5/2021)      losartan (COZAAR) 25 MG tablet Take 1 tablet by mouth daily for 180 days. (Patient not taking: Reported on 8/5/2021) 30 tablet 0    aspirin 325 MG tablet Take 325 mg by mouth daily. No current facility-administered medications for this visit. Family History   Problem Relation Age of Onset    Diabetes Mother     High Blood Pressure Father     Diabetes Maternal Grandmother     Cancer Paternal Grandmother         ovarian    Colon Cancer Neg Hx     Rectal Cancer Neg Hx     Esophageal Cancer Neg Hx     Stomach Cancer Neg Hx         /70 (Site: Left Upper Arm, Position: Sitting, Cuff Size: Medium Adult)   Pulse 76   Temp 98.6 °F (37 °C)   Ht 5' 5\" (1.651 m)   Wt 185 lb (83.9 kg)   SpO2 98% Comment: room air at rest  BMI 30.79 kg/m²   Mallampati airway Class:IV  Neck Circumference:15.5 Inches  Nursing note and vitals reviewed. Constitutional: Patient appears moderately built and moderately nourished. No distress. Patient is oriented to person, place, and time. HENT:   Head: Normocephalic and atraumatic. Right Ear: External ear normal.   Left Ear: External ear normal.   Mouth/Throat: Oropharynx is clear and moist.  No oral thrush. Eyes: Conjunctivae are normal. Pupils are equal, round, and reactive to light. No scleral icterus. Neck: Neck supple. No JVD present. No tracheal deviation present. Surgical scar present over the right side of the neck  Cardiovascular: Normal rate, regular rhythm, normal heart sounds. No murmur heard. Pulmonary/Chest: Effort normal and breath sounds normal. No stridor. No respiratory distress. Occasional expiratory wheezes. No rales. Patient exhibits no tenderness. Abdominal: Soft. Patient exhibits no distension. No tenderness. Musculoskeletal: Normal range of motion. Extremities: Patient exhibits no edema and no tenderness. Lymphadenopathy:  No cervical adenopathy. Neurological: Patient is alert and oriented to person, place, and time. Skin: Skin is warm and dry. Patient is not diaphoretic. Psychiatric: Patient  has a normal mood and affect. Patient behavior is normal. l.       Diagnostic Data:    Split-night sleep study performed on 5 February 2021 at [facility] THE [facility] sleep lab:  Reviewed    Chest Xray: 03/2/2021      The above radiological study films were reviewed by me. PFTS: 03/2/2021          Split-night sleep study performed at [facility] hospital: 2/5/2021  Reviewed    D-dimer: 11 March 2021          CTA of chest:  Fri Mar 12, 2021 12:04 PM   PROCEDURE: CTA CHEST W WO CONTRAST   1. No PE to ascending and descending thoracic aortic aneurysms, the ascending aorta measures up to 4.2 cm at the level the right pulmonary artery. 2. Cardiomegaly. 3. Thyromegaly. MCCT test: Performed on 27 April 2021                Assessment:  -Mild intermittent bronchial asthma- Late onset. Patient quit using Flovent after using for 2 days. She rarely using her rescue inhaler. Under control  -Coronary artery disease status post multiple stents in place  -Moderately severe obstructive sleep apnea. She is currently on treatment with the BiPAP pressure of 17/11 centimeters of water.  She follows with [facility] sleep clinic.  -Hypersomnia ( Excessive daytime sleepiness) may be due to obstructive sleep apnea Vs Inadequate sleep hygiene Vs her Medical Marijuana-improved  -Status post CABG at the age of 38 years.  -Chronic history of tobacco smoking. She still smoking 2 packs of cigarettes per day. She still smoking cigarettes  -Depression.  -Type 2 diabetes mellitus. .  -She is working night shift in the past.    -She is currently on treatment with medical marijuana for her chronic back pain  -Anxiety disorder. Recommendations/Plan:  -Stop Flovent HFA 110mcg/INH, 2INH BID.   -Continue Albuterol HFA 90mcg/Spray MDI, 2puffs  Q6Hprn.  -Patient educated to update his pneumococcal vaccine with family physician and take influenza vaccine in coming season with out fail. Patient verbalizes understanding.  -Jose Wahl educated about environmental safety precautions she need to practice to prevent exacerbation ofher Asthma. Jose Vish verbalizes understanding.   -Patient educated to quit smoking as soon as possible. Patient verbalizes understanding of adverse consequences of tobacco smoking.  -Advised to keep her scheduled follow-up with Dr. Susana Guzman MD regarding her carotid artery disease.  -She is currently following with Dr. Joe Ornelas surgeon at [facility] for further evaluation of ascending and descending thoracic aortic aneurysms, the ascending aorta measures up to 4.2 cm at the level the right pulmonary artery.  -Jose Wahl was advised  to keep her scheduled follow up at [facility] for pulmonary disease) sleep clinic with Ms. Lori Whitney CNP for further evaluation and management of sleep apnea with down load. - Schedule patient for follow up with my clinic in 12months for clinical reevaluation with spirometry before clinic visit. Patient advised to make early appointment if needed.  -Jose Wahl educated about my impression and plan.  Patient verbalizes understanding.        -I personally reviewed updated the Past medical hx, Past surgical

## 2021-07-09 LAB
ANTITHROMBIN ACTIVITY: 124 % (ref 76–128)
FACTOR VII ASSAY: NORMAL
PROTEIN C FUNCTIONAL: 159 % (ref 83–168)
PROTEIN S ANTIGEN, TOTAL: 156 % (ref 63–126)

## 2021-07-12 LAB
MISC #3 REFERENCE REPORT: ABNORMAL
MISC. #1 REFERENCE GROUP TEST: NORMAL
MISC. #2 REFERENCE REPORT: NORMAL

## 2021-08-05 ENCOUNTER — OFFICE VISIT (OUTPATIENT)
Dept: PULMONOLOGY | Age: 66
End: 2021-08-05
Payer: MEDICARE

## 2021-08-05 VITALS
SYSTOLIC BLOOD PRESSURE: 118 MMHG | TEMPERATURE: 98.6 F | HEIGHT: 65 IN | HEART RATE: 76 BPM | BODY MASS INDEX: 30.82 KG/M2 | DIASTOLIC BLOOD PRESSURE: 70 MMHG | WEIGHT: 185 LBS | OXYGEN SATURATION: 98 %

## 2021-08-05 DIAGNOSIS — J45.20 MILD INTERMITTENT ASTHMA WITHOUT COMPLICATION: Primary | ICD-10-CM

## 2021-08-05 PROCEDURE — 1123F ACP DISCUSS/DSCN MKR DOCD: CPT | Performed by: INTERNAL MEDICINE

## 2021-08-05 PROCEDURE — G8400 PT W/DXA NO RESULTS DOC: HCPCS | Performed by: INTERNAL MEDICINE

## 2021-08-05 PROCEDURE — 1090F PRES/ABSN URINE INCON ASSESS: CPT | Performed by: INTERNAL MEDICINE

## 2021-08-05 PROCEDURE — 99214 OFFICE O/P EST MOD 30 MIN: CPT | Performed by: INTERNAL MEDICINE

## 2021-08-05 PROCEDURE — 4040F PNEUMOC VAC/ADMIN/RCVD: CPT | Performed by: INTERNAL MEDICINE

## 2021-08-05 PROCEDURE — G8417 CALC BMI ABV UP PARAM F/U: HCPCS | Performed by: INTERNAL MEDICINE

## 2021-08-05 PROCEDURE — G8427 DOCREV CUR MEDS BY ELIG CLIN: HCPCS | Performed by: INTERNAL MEDICINE

## 2021-08-05 PROCEDURE — 3017F COLORECTAL CA SCREEN DOC REV: CPT | Performed by: INTERNAL MEDICINE

## 2021-08-05 PROCEDURE — 4004F PT TOBACCO SCREEN RCVD TLK: CPT | Performed by: INTERNAL MEDICINE

## 2021-08-05 RX ORDER — CLOPIDOGREL BISULFATE 75 MG/1
75 TABLET ORAL DAILY
COMMUNITY

## 2021-08-05 RX ORDER — ASPIRIN 81 MG/1
81 TABLET ORAL DAILY
COMMUNITY

## 2021-08-05 NOTE — PROGRESS NOTES
Neck Circumference -   15.5  Mallampati - 4    Lung Nodule Screening     [] Qualifies    [] Does not qualify   [] Declined    [] Completed

## 2021-08-05 NOTE — PATIENT INSTRUCTIONS
Recommendations/Plan:  -Stop Flovent HFA 110mcg/INH, 2INH BID.   -Continue Albuterol HFA 90mcg/Spray MDI, 2puffs  Q6Hprn.  -Patient educated to update his pneumococcal vaccine with family physician and take influenza vaccine in coming season with out fail. Patient verbalizes understanding.  -Scot Mann educated about environmental safety precautions she need to practice to prevent exacerbation ofher Asthma. Scot Mann verbalizes understanding.   -Patient educated to quit smoking as soon as possible. Patient verbalizes understanding of adverse consequences of tobacco smoking.  -Advised to keep her scheduled follow-up with Dr. Jose Arevalo MD regarding her carotid artery disease.  -She is currently following with Dr. Federico Jeffrey surgeon at [facility] for further evaluation of ascending and descending thoracic aortic aneurysms, the ascending aorta measures up to 4.2 cm at the level the right pulmonary artery.  -Scot Mann was advised  to keep her scheduled follow up at [facility] for pulmonary disease) sleep clinic with Ms. Janessa Benítez CNP for further evaluation and management of sleep apnea with down load. - Schedule patient for follow up with my clinic in 12months for clinical reevaluation with spirometry before clinic visit. Patient advised to make early appointment if needed.  -Scot Mann educated about my impression and plan. Patient verbalizes understanding.

## 2022-01-06 ENCOUNTER — TELEPHONE (OUTPATIENT)
Dept: PULMONOLOGY | Age: 67
End: 2022-01-06

## 2022-01-06 NOTE — TELEPHONE ENCOUNTER
Pt called about her appt on 1/19. Her BIPAP has not been recording her download since the 1st night she got a replacement. She wanted to just forget appt. , she has a lot of medical issues and cannot go to [facility]. Said she wears PAP everyday. I told her Medicare will require she is seen yearly to be able to get supplies. R/S to May and called [facility]. Boom Garcia thinks she lives in an area where it will not read. Sugar Au will go to her house and exchange her chip before her appt. For download.  He will contac pt

## 2022-03-09 NOTE — PROGRESS NOTES
PAT call attempted, patient unavailable, left message to please call us back at your earliest convenience; [phone]

## 2022-03-11 RX ORDER — DIPHENHYDRAMINE HCL 25 MG
TABLET ORAL DAILY
COMMUNITY

## 2022-03-11 RX ORDER — INSULIN GLARGINE 100 [IU]/ML
35 INJECTION, SOLUTION SUBCUTANEOUS NIGHTLY
COMMUNITY

## 2022-03-11 RX ORDER — LEVOTHYROXINE SODIUM 0.12 MG/1
125 TABLET ORAL DAILY
COMMUNITY

## 2022-03-11 RX ORDER — BIOTIN 10 MG
2 TABLET ORAL DAILY
COMMUNITY

## 2022-03-11 RX ORDER — METOPROLOL SUCCINATE 25 MG/1
25 TABLET, EXTENDED RELEASE ORAL DAILY
COMMUNITY

## 2022-03-11 RX ORDER — MAGNESIUM 200 MG
TABLET ORAL DAILY
COMMUNITY

## 2022-03-11 NOTE — PROGRESS NOTES
NPO after midnight  [employer] and drivers license  Wear comfortable clean clothing  Do not bring jewelry  Shower night before and morning of surgery with a liquid antibacterial soap  Bring list of medications with dosage and how often taken  Follow all instructions given by your physician   needed at discharge  No visitors allowed in with patient at this time  Please bring and wear mask  Call [phone] for any questions

## 2022-03-14 NOTE — PROGRESS NOTES
EKG REVIEWED  PER ANESTHESIA DR. Anushka Davison REQUESTED FOR UPCOMING PROCEDURE ON 03/16/2022. CHEO AT DR. DE LEÓN NOTIFIED .

## 2022-03-16 ENCOUNTER — ANESTHESIA (OUTPATIENT)
Dept: OPERATING ROOM | Age: 67
End: 2022-03-16
Payer: MEDICARE

## 2022-03-16 ENCOUNTER — HOSPITAL ENCOUNTER (OUTPATIENT)
Age: 67
Setting detail: OUTPATIENT SURGERY
Discharge: HOME | End: 2022-03-16
Attending: SPECIALIST | Admitting: SPECIALIST
Payer: MEDICARE

## 2022-03-16 ENCOUNTER — ANESTHESIA EVENT (OUTPATIENT)
Dept: OPERATING ROOM | Age: 67
End: 2022-03-16
Payer: MEDICARE

## 2022-03-16 VITALS
SYSTOLIC BLOOD PRESSURE: 146 MMHG | DIASTOLIC BLOOD PRESSURE: 65 MMHG | OXYGEN SATURATION: 97 % | RESPIRATION RATE: 18 BRPM

## 2022-03-16 VITALS
DIASTOLIC BLOOD PRESSURE: 64 MMHG | HEIGHT: 65 IN | SYSTOLIC BLOOD PRESSURE: 145 MMHG | OXYGEN SATURATION: 94 % | WEIGHT: 202 LBS | BODY MASS INDEX: 33.66 KG/M2 | HEART RATE: 16 BPM | RESPIRATION RATE: 16 BRPM | TEMPERATURE: 96.9 F

## 2022-03-16 LAB — GLUCOSE BLD-MCNC: 153 MG/DL (ref 70–108)

## 2022-03-16 PROCEDURE — 82948 REAGENT STRIP/BLOOD GLUCOSE: CPT

## 2022-03-16 PROCEDURE — 3700000000 HC ANESTHESIA ATTENDED CARE: Performed by: SPECIALIST

## 2022-03-16 PROCEDURE — 7100000010 HC PHASE II RECOVERY - FIRST 15 MIN: Performed by: SPECIALIST

## 2022-03-16 PROCEDURE — 88331 PATH CONSLTJ SURG 1 BLK 1SPC: CPT

## 2022-03-16 PROCEDURE — 3700000001 HC ADD 15 MINUTES (ANESTHESIA): Performed by: SPECIALIST

## 2022-03-16 PROCEDURE — 88305 TISSUE EXAM BY PATHOLOGIST: CPT

## 2022-03-16 PROCEDURE — 3600000002 HC SURGERY LEVEL 2 BASE: Performed by: SPECIALIST

## 2022-03-16 PROCEDURE — 7100000011 HC PHASE II RECOVERY - ADDTL 15 MIN: Performed by: SPECIALIST

## 2022-03-16 PROCEDURE — 6360000002 HC RX W HCPCS: Performed by: NURSE ANESTHETIST, CERTIFIED REGISTERED

## 2022-03-16 PROCEDURE — 2580000003 HC RX 258: Performed by: NURSE ANESTHETIST, CERTIFIED REGISTERED

## 2022-03-16 PROCEDURE — 2500000003 HC RX 250 WO HCPCS: Performed by: NURSE ANESTHETIST, CERTIFIED REGISTERED

## 2022-03-16 PROCEDURE — 2709999900 HC NON-CHARGEABLE SUPPLY: Performed by: SPECIALIST

## 2022-03-16 PROCEDURE — 2500000003 HC RX 250 WO HCPCS: Performed by: SPECIALIST

## 2022-03-16 PROCEDURE — 3600000012 HC SURGERY LEVEL 2 ADDTL 15MIN: Performed by: SPECIALIST

## 2022-03-16 RX ORDER — PROPOFOL 10 MG/ML
INJECTION, EMULSION INTRAVENOUS PRN
Status: DISCONTINUED | OUTPATIENT
Start: 2022-03-16 | End: 2022-03-16 | Stop reason: SDUPTHER

## 2022-03-16 RX ORDER — CLINDAMYCIN PHOSPHATE 150 MG/ML
INJECTION, SOLUTION INTRAVENOUS PRN
Status: DISCONTINUED | OUTPATIENT
Start: 2022-03-16 | End: 2022-03-16 | Stop reason: SDUPTHER

## 2022-03-16 RX ORDER — LIDOCAINE HYDROCHLORIDE AND EPINEPHRINE 10; 10 MG/ML; UG/ML
INJECTION, SOLUTION INFILTRATION; PERINEURAL PRN
Status: DISCONTINUED | OUTPATIENT
Start: 2022-03-16 | End: 2022-03-16 | Stop reason: ALTCHOICE

## 2022-03-16 RX ORDER — LIDOCAINE HYDROCHLORIDE 10 MG/ML
INJECTION, SOLUTION INFILTRATION; PERINEURAL PRN
Status: DISCONTINUED | OUTPATIENT
Start: 2022-03-16 | End: 2022-03-16 | Stop reason: SDUPTHER

## 2022-03-16 RX ORDER — SODIUM CHLORIDE 9 MG/ML
INJECTION, SOLUTION INTRAVENOUS CONTINUOUS
Status: DISCONTINUED | OUTPATIENT
Start: 2022-03-16 | End: 2022-03-16 | Stop reason: HOSPADM

## 2022-03-16 RX ORDER — LIDOCAINE HYDROCHLORIDE 10 MG/ML
INJECTION, SOLUTION INFILTRATION; PERINEURAL PRN
Status: DISCONTINUED | OUTPATIENT
Start: 2022-03-16 | End: 2022-03-16 | Stop reason: ALTCHOICE

## 2022-03-16 RX ORDER — SODIUM CHLORIDE 9 MG/ML
INJECTION, SOLUTION INTRAVENOUS CONTINUOUS PRN
Status: DISCONTINUED | OUTPATIENT
Start: 2022-03-16 | End: 2022-03-16 | Stop reason: SDUPTHER

## 2022-03-16 RX ADMIN — PROPOFOL 30 MG: 10 INJECTION, EMULSION INTRAVENOUS at 13:03

## 2022-03-16 RX ADMIN — PROPOFOL 30 MG: 10 INJECTION, EMULSION INTRAVENOUS at 12:45

## 2022-03-16 RX ADMIN — PROPOFOL 30 MG: 10 INJECTION, EMULSION INTRAVENOUS at 12:37

## 2022-03-16 RX ADMIN — PROPOFOL 30 MG: 10 INJECTION, EMULSION INTRAVENOUS at 12:29

## 2022-03-16 RX ADMIN — PROPOFOL 50 MG: 10 INJECTION, EMULSION INTRAVENOUS at 12:19

## 2022-03-16 RX ADMIN — PROPOFOL 30 MG: 10 INJECTION, EMULSION INTRAVENOUS at 12:57

## 2022-03-16 RX ADMIN — LIDOCAINE HYDROCHLORIDE 50 MG: 10 INJECTION, SOLUTION INFILTRATION; PERINEURAL at 12:21

## 2022-03-16 RX ADMIN — PROPOFOL 30 MG: 10 INJECTION, EMULSION INTRAVENOUS at 13:08

## 2022-03-16 RX ADMIN — CLINDAMYCIN PHOSPHATE 900 MG: 150 INJECTION, SOLUTION INTRAVENOUS at 12:21

## 2022-03-16 RX ADMIN — SODIUM CHLORIDE: 9 INJECTION, SOLUTION INTRAVENOUS at 12:14

## 2022-03-16 ASSESSMENT — PULMONARY FUNCTION TESTS
PIF_VALUE: 0

## 2022-03-16 ASSESSMENT — PAIN - FUNCTIONAL ASSESSMENT: PAIN_FUNCTIONAL_ASSESSMENT: 0-10

## 2022-03-16 ASSESSMENT — LIFESTYLE VARIABLES: SMOKING_STATUS: 1

## 2022-03-16 ASSESSMENT — PAIN SCALES - GENERAL: PAINLEVEL_OUTOF10: 0

## 2022-03-16 NOTE — H&P
[facility]  History and Physical Update    Pt Name: Margoth Daniel  MRN: [id number]  YOB: 1955  Date of evaluation: 3/16/2022    I have examined the patient and reviewed the H&P/Consult and there are no changes to the patient or plans.       Salma Arenas MD  Electronically signed 3/16/2022 at 7:15 AM

## 2022-03-16 NOTE — PROGRESS NOTES
1326-Patient to Phase II via chair. Report received from [facility]. Patient awake and alert. Vitals obtained and stable. Respirations even and unlabored on room air. Patient with ace wrap to left lower extremity. Post op shoe in place No drainage noted. Graft site to right groin intact with surgical glue. No drainage.  in room. Denies needs. Call light in reach. 1330-Patient provided with snack and drink. 1400-IV removed with no complications. Discharge instructions reviewed. Verbalized understanding. Patient getting dressed at bedside. Waiting for Dr. Arash Izquierdo to come to room. 1424-Patient meets discharge criteria. Discharged in stable condition. Patient brought to car via wheelchair with assistance from RN. Patient tolerated well. All belongings sent with patient.

## 2022-03-16 NOTE — ANESTHESIA POSTPROCEDURE EVALUATION
Department of Anesthesiology  Postprocedure Note    Patient: Domingo Wilkinson  MRN: [id number]  YOB: 1955  Date of evaluation: 3/16/2022  Time:  2:10 PM     Procedure Summary     Date: 03/16/22 Room / Location: [facility] OR 04 / [address] [facility]    Anesthesia Start: 1214 Anesthesia Stop: 5425    Procedure: EXCISION ATYPICAL SCC PROLIFERATION LEFT GREAT TOE WITH FULL THICKNESS SKIN GRAFT LEFT UPPER THIGH  AND FROZEN SECTION (Left Foot) Diagnosis: (ATYPICAL SCC PROLIFERATION LEFT GREAT TOE)    Surgeons: Zeke Williamson MD Responsible Provider: Familia Sanchez DO    Anesthesia Type: MAC ASA Status: 3          Anesthesia Type: MAC    Rhea Phase I:      Rhea Phase II: Rhea Score: 9    Last vitals: Reviewed and per EMR flowsheets.        Anesthesia Post Evaluation    Patient location during evaluation: bedside  Patient participation: complete - patient participated  Level of consciousness: awake  Airway patency: patent  Nausea & Vomiting: no vomiting and no nausea  Cardiovascular status: hemodynamically stable  Respiratory status: acceptable  Hydration status: stable

## 2022-03-16 NOTE — ANESTHESIA PRE PROCEDURE
Department of Anesthesiology  Preprocedure Note       Name:  Abdelrahman Dodson   Age:  74 y.o.  :  1955                                          MRN:  [id number]         Date:  3/16/2022      Surgeon: Hanh Michaud):  Kristopher Painter MD    Procedure: Procedure(s):  EXCISION ATYPICAL SCC PROLIFERATION LEFT GREAT TOE WITH SKIN GRAFT AND FROZEN SECTION    Medications prior to admission:   Prior to Admission medications    Medication Sig Start Date End Date Taking?  Authorizing Provider   MECLIZINE HCL PO Take 8 mg by mouth every 8 hours as needed   Yes Historical Provider, MD   metoprolol succinate (TOPROL XL) 25 MG extended release tablet Take 25 mg by mouth daily   Yes Historical Provider, MD   levothyroxine (SYNTHROID) 125 MCG tablet Take 125 mcg by mouth Daily   Yes Historical Provider, MD   diphenhydrAMINE (BENADRYL) 25 MG tablet Take by mouth daily 2 tab   Yes Historical Provider, MD   insulin glargine (LANTUS) 100 UNIT/ML injection vial Inject 35 Units into the skin nightly Takes at 1am   Yes Historical Provider, MD   insulin lispro (HUMALOG) 100 UNIT/ML injection vial Inject into the skin 3 times daily (before meals) Sliding scale   Yes Historical Provider, MD   LATANOPROST OP Apply to eye daily Both eyes   Yes Historical Provider, MD   Multiple Vitamins-Minerals (MULTIVITAMIN ADULT) CHEW Take 2 tablets by mouth daily   Yes Historical Provider, MD   Cyanocobalamin (VITAMIN B-12) 1000 MCG SUBL Take by mouth daily gummy   Yes Historical Provider, MD   Cholecalciferol (VITAMIN D3) 50 MCG (2000 UT) CHEW Take by mouth daily   Yes Historical Provider, MD   FOLIC ACID PO Take 1 mg by mouth daily    Yes Historical Provider, MD   clopidogrel (PLAVIX) 75 MG tablet Take 75 mg by mouth daily   Yes Historical Provider, MD   aspirin 81 MG EC tablet Take 81 mg by mouth daily   Yes Historical Provider, MD   valsartan (DIOVAN) 320 MG tablet Take 320 mg by mouth daily   Yes Historical Provider, MD   magnesium oxide (MAG-OX) 400 MG tablet Take 400 mg by mouth daily   Yes Historical Provider, MD   medical marijuana Take by mouth as needed. Yes Historical Provider, MD   Glucosamine-Chondroit-Vit C-Mn (GLUCOSAMINE CHONDR 1500 COMPLX) CAPS Take by mouth daily    Yes Historical Provider, MD   DULoxetine (CYMBALTA) 30 MG capsule Take by mouth daily 1-2 tab   Yes Historical Provider, MD   rosuvastatin (CRESTOR) 20 MG tablet Take 20 mg by mouth daily. Yes Historical Provider, MD   isosorbide mononitrate (IMDUR) 60 MG CR tablet Take 60 mg by mouth daily. Yes Historical Provider, MD   Pantoprazole Sodium (PROTONIX) 40 MG PACK packet Take 1 packet by mouth daily. Yes Historical Provider, MD       Current medications:    Current Facility-Administered Medications   Medication Dose Route Frequency Provider Last Rate Last Admin    0.9 % sodium chloride infusion   IntraVENous Continuous Eddy Luna MD        ceFAZolin (ANCEF) 2000 mg in dextrose 5 % 50 mL IVPB  2,000 mg IntraVENous On Call to 56 Oma Marie MD           Allergies:     Allergies   Allergen Reactions    Adhesive Tape      Makes skin raw    Keflex [Cephalexin] Hives    Pcn [Penicillins] Hives    Plavix [Clopidogrel Bisulfate] Hives    Ticlid [Ticlopidine Hydrochloride] Hives    Bactrim [Sulfamethoxazole-Trimethoprim] Rash       Problem List:    Patient Active Problem List   Diagnosis Code    Chronic cough R05.3    Tobacco abuse Z72.0    Cough variant asthma J45.991    Ulcer of toe of left foot (HCC) L97.529    Cellulitis of toe of left foot L03.032    PVD (peripheral vascular disease) ([facility] I73.9    DM type 2 (diabetes mellitus, type 2) ([facility] E11.9    Diabetic sensorimotor neuropathy ([facility] E11.42       Past Medical History:        Diagnosis Date    Allergic rhinitis     Anxiety     CAD (coronary artery disease)     Cancer ([facility]     skin    Cerebral artery occlusion with cerebral infarction ([address] 06/14/2021    COPD [facility]chronic obstructive pulmonary disease) ([facility]     Depression     GERD (gastroesophageal reflux disease)     Hypercholesteremia     Hyperlipidemia     Hypertension     benign    Hypothyroidism     Irritable bowel syndrome     Myalgia and myositis     Sinusitis, acute maxillary     Tobacco use disorder     Type II or unspecified type diabetes mellitus without mention of complication, not stated as uncontrolled     Unspecified sleep apnea     has BIPAP       Past Surgical History:        Procedure Laterality Date    BREAST SURGERY Left     cyst    CAROTID ENDARTERECTOMY Right     CHOLECYSTECTOMY      COLONOSCOPY  2019    CORONARY ANGIOPLASTY WITH STENT PLACEMENT      multiple    CORONARY ARTERY BYPASS GRAFT      EGD  8425,3534    KNEE SURGERY Left     torn meniscus    LAPAROSCOPY      exploratory    LUMBAR LAMINECTOMY      SHOULDER SURGERY Right     bursitis    TONSILLECTOMY         Social History:    Social History     Tobacco Use    Smoking status: Current Every Day Smoker     Packs/day: 1.00     Years: 50.00     Pack years: 50.00     Types: Cigarettes     Start date: 1971    Smokeless tobacco: Never Used    Tobacco comment: has cut down within last month to 1 pack   Substance Use Topics    Alcohol use: Yes     Comment: rare                                Ready to quit: Not Answered  Counseling given: Not Answered  Comment: has cut down within last month to 1 pack      Vital Signs (Current):   Vitals:    03/11/22 1402 03/16/22 1026 03/16/22 1038   BP:  (!) 187/82 (!) 190/81   Pulse:  (!) 46    Resp:  16    Temp:  96.6 °F (35.9 °C)    TempSrc:  Temporal    SpO2:  95%    Weight: 200 lb (90.7 kg) 202 lb (91.6 kg)    Height: 5' 5\" (1.651 m) 5' 5\" (1.651 m)                                               BP Readings from Last 3 Encounters:   03/16/22 (!) 190/81   08/05/21 118/70   05/06/21 122/70       NPO Status: Time of last liquid consumption: 0000                        Time of last solid consumption: 0000 Date of last liquid consumption: 03/16/22                        Date of last solid food consumption: 03/16/22    BMI:   Wt Readings from Last 3 Encounters:   03/16/22 202 lb (91.6 kg)   08/05/21 185 lb (83.9 kg)   05/06/21 177 lb (80.3 kg)     Body mass index is 33.61 kg/m². CBC: No results found for: WBC, RBC, HGB, HCT, MCV, RDW, PLT    CMP: No results found for: NA, K, CL, CO2, BUN, CREATININE, GFRAA, AGRATIO, LABGLOM, GLUCOSE, GLU, PROT, CALCIUM, BILITOT, ALKPHOS, AST, ALT    POC Tests:   Recent Labs     03/16/22  1043   POCGLU 153*       Coags: No results found for: PROTIME, INR, APTT    HCG (If Applicable): No results found for: PREGTESTUR, PREGSERUM, HCG, HCGQUANT     ABGs: No results found for: PHART, PO2ART, RPP7LUQ, GQP3XJK, BEART, H7OXTOUZ     Type & Screen (If Applicable):  No results found for: LABABO, LABRH    Drug/Infectious Status (If Applicable):  No results found for: HIV, HEPCAB    COVID-19 Screening (If Applicable): No results found for: COVID19        Anesthesia Evaluation  Patient summary reviewed  Airway: Mallampati: II  TM distance: >3 FB   Neck ROM: full  Mouth opening: > = 3 FB Dental:    (+) poor dentition      Pulmonary:   (+) COPD:  asthma: current smoker          Patient smoked on day of surgery. Cardiovascular:    (+) hypertension:, CAD:, CABG/stent:,       ECG reviewed                        Neuro/Psych:   (+) CVA:, neuromuscular disease:, psychiatric history:            GI/Hepatic/Renal:             Endo/Other:    (+) Diabetes, hypothyroidism::., .                 Abdominal:             Vascular: Other Findings:             Anesthesia Plan      MAC     ASA 3       Induction: intravenous. Anesthetic plan and risks discussed with patient and spouse. Plan discussed with JOSÉ Harris.  [address] [place] [address]   3/16/2022

## 2022-03-16 NOTE — OP NOTE
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   Local anesthesia consisting of 11 ml of 1% Lidocaine 1:100,000 with epinephrine solution of the donor site of the left inguinal area as well as 5ml of 1% plain lidocaine to left great toe. The atypical growth was excised at 3cm diameter and sent for fresh frozen evaluation after the area was prepped and draped in the standard surgical fashion. Pathology called back to the room and stated the margin were free. The patient has very thin skin and a large (7cm2)  defect which could not be closed primarily, therefore a 7cm2 full thickness skin graft was taken. It was defatted and secured in position with a 3-0 silk suture tie and then a 5-0 fast absorbable suture was placed in a simple running fashion. A Bacitracin Xeroform and moist cotton ball tie over bolster was secured using the tails of the silk sutures. The donor site was closed with a 3-0 Monocryl suture placed in interrupted buried fashion and then Histoacryl respectively. The patient tolerated the procedure well and remained hemodynamically stable throughout the procedure and was quite comfortable throughout the operative course.     Clinical staging for cancer cases:  Ct  Cn  Cm    Kristina Hernandes MD  Electronically signed by me on 3/16/2022 at 1:21 PM  Operative Note      Patient: Lolita Dumont  YOB: 1955  MRN: [id number]    Date of Procedure: 3/16/2022    Pre-Op Diagnosis: ATYPICAL SCC PROLIFERATION LEFT GREAT TOE    Post-Op Diagnosis: Same       Procedure(s):  EXCISION ATYPICAL SCC PROLIFERATION LEFT GREAT TOE WITH SKIN GRAFT AND FROZEN SECTION    Surgeon(s):  Mark Mg MD    Assistant:   Physician Assistant: Lora Mora PA-C    Anesthesia: Monitor Anesthesia Care    Estimated Blood Loss (mL): Minimal    Complications: None    Specimens:   * No specimens in log *    Implants:  * No implants in log *      Drains: * No LDAs found *    Findings:  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

## 2022-05-18 ENCOUNTER — OFFICE VISIT (OUTPATIENT)
Dept: PULMONOLOGY | Age: 67
End: 2022-05-18
Payer: MEDICARE

## 2022-05-18 VITALS
HEIGHT: 65 IN | SYSTOLIC BLOOD PRESSURE: 144 MMHG | DIASTOLIC BLOOD PRESSURE: 72 MMHG | HEART RATE: 56 BPM | WEIGHT: 205.5 LBS | TEMPERATURE: 97.4 F | BODY MASS INDEX: 34.24 KG/M2 | OXYGEN SATURATION: 95 %

## 2022-05-18 DIAGNOSIS — R63.5 WEIGHT GAIN: ICD-10-CM

## 2022-05-18 DIAGNOSIS — G47.33 OSA TREATED WITH BIPAP: Primary | ICD-10-CM

## 2022-05-18 DIAGNOSIS — Z86.73 HISTORY OF STROKE: ICD-10-CM

## 2022-05-18 DIAGNOSIS — F32.A ANXIETY AND DEPRESSION: ICD-10-CM

## 2022-05-18 DIAGNOSIS — J45.30 MILD PERSISTENT ASTHMA WITHOUT COMPLICATION: ICD-10-CM

## 2022-05-18 DIAGNOSIS — F41.9 ANXIETY AND DEPRESSION: ICD-10-CM

## 2022-05-18 PROCEDURE — 4040F PNEUMOC VAC/ADMIN/RCVD: CPT | Performed by: NURSE PRACTITIONER

## 2022-05-18 PROCEDURE — G8400 PT W/DXA NO RESULTS DOC: HCPCS | Performed by: NURSE PRACTITIONER

## 2022-05-18 PROCEDURE — 1090F PRES/ABSN URINE INCON ASSESS: CPT | Performed by: NURSE PRACTITIONER

## 2022-05-18 PROCEDURE — 3017F COLORECTAL CA SCREEN DOC REV: CPT | Performed by: NURSE PRACTITIONER

## 2022-05-18 PROCEDURE — 1123F ACP DISCUSS/DSCN MKR DOCD: CPT | Performed by: NURSE PRACTITIONER

## 2022-05-18 PROCEDURE — G8427 DOCREV CUR MEDS BY ELIG CLIN: HCPCS | Performed by: NURSE PRACTITIONER

## 2022-05-18 PROCEDURE — G8417 CALC BMI ABV UP PARAM F/U: HCPCS | Performed by: NURSE PRACTITIONER

## 2022-05-18 PROCEDURE — 4004F PT TOBACCO SCREEN RCVD TLK: CPT | Performed by: NURSE PRACTITIONER

## 2022-05-18 PROCEDURE — 99214 OFFICE O/P EST MOD 30 MIN: CPT | Performed by: NURSE PRACTITIONER

## 2022-05-18 RX ORDER — FENOFIBRATE 145 MG/1
145 TABLET, COATED ORAL DAILY
COMMUNITY

## 2022-05-18 RX ORDER — ONDANSETRON HYDROCHLORIDE 8 MG/1
8 TABLET, FILM COATED ORAL EVERY 8 HOURS PRN
COMMUNITY

## 2022-05-18 RX ORDER — DICYCLOMINE HYDROCHLORIDE 10 MG/1
10 CAPSULE ORAL
COMMUNITY

## 2022-05-18 ASSESSMENT — ENCOUNTER SYMPTOMS
VOMITING: 0
WHEEZING: 0
EYES NEGATIVE: 1
ABDOMINAL PAIN: 0
SHORTNESS OF BREATH: 0
DIARRHEA: 0
COUGH: 0
NAUSEA: 0
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
## 2022-07-05 NOTE — PROGRESS NOTES
[place] [facility] [address]  Pulmonary medicine clinic follow up note     Patient: Arabella Jane  : 1955      Chief complaint/[place]:  Arabella Jane is a 74 y.o. old female came for follow up regarding her Mild persistent bronchial asthma with a spirometry before clinic visit. She underwent thromboendarterectomy due to her right-sided carotid artery stenosis at [facility] on 2021. She was prescribed with the Flovent HFA 2puffs twice daily along with albuterol HFA 2 puffs every 6 hours as needed at the last visit. Patient used to Flovent HFA 2 puffs every 12 hours for 2 to 3 days. She quit using her Flovent. Patient rarely using her albuterol HFA 2 puffs every 6 hours as needed. On today's questioning:  She denies cough or expectoration. However she is having exertional dyspnea. She want to go back on Flovent and albuterol treatment. She denies hemoptysis. She denies fever or chills. She denies recent hospitalizations or emergency room visits. She is currently not using any maintenance inhaler  She is using rescue inhaler/nebs rarely. She denies recent loss of weight or appetite changes. She denies recent decline in functional status. She is currently using BiPAP machine at nighttime. She is currently not using any oxygen supplementation at rest, exercise or during sleep/at night time. 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 She used to work at a [employer] in the past as a registered nurse in [address] [place] [address]. History of tobacco smoking:Yes  Amount of tobacco smokin.0 PPD. Years of tobacco smokin. Quit smoking: No.              Current smoker: Yes. Amount of current tobacco smokin.0 PPD  . History of recreational or IV drug use in the past:NO     History of exposure to coal mines/coal dust: NO  History of exposure to foundry dust/welding: NO  History of exposure to quarry/silica/sandblasting: NO  History of exposure to asbestos/working with breaks/ships: NO  History of exposure to farm dust: NO  History of recent travel to long distances: NO  History of exposure to birds, pigeons, or chickens in the past:NO  Pet animals at home: Yes  Dogs: 1    History of pulmonary embolism in the past: No            History of DVT in the past:No                             Review of Systems:   General/Constitutional: she gained 29lbs of weight from the last visit. No fever or chills. HENT: Negative. Eyes: Negative. Upper respiratory tract: No nasal stuffiness or post nasal drip. Lower respiratory tract/ lungs: No cough or sputum production. Cardiovascular: No palpitations or chest pain. Gastrointestinal: No nausea or vomiting. Neurological: No focal neurologiacal weakness. Extremities: No edema. Musculoskeletal: No complaints. Genitourinary: No complaints. Hematological: Negative. Psychiatric/Behavioral: Negative. Skin: No itching.         Past Medical History:   Diagnosis Date    Allergic rhinitis     Anxiety     CAD (coronary artery disease)     Cancer ([facility] [address]     skin    Cerebral artery occlusion with cerebral infarction ([facility] [address] [facility] 2021    COPD (chronic obstructive pulmonary disease) (HCC)     Depression     GERD (gastroesophageal reflux disease)     Hypercholesteremia     Hyperlipidemia     Hypertension     benign    Hypothyroidism     Irritable bowel syndrome     Myalgia and myositis     Sinusitis, acute maxillary     Tobacco use disorder     Type II or unspecified type diabetes mellitus without mention of complication, not stated as uncontrolled     Unspecified sleep apnea     has BIPAP       Past Surgical History:   Procedure sertraline (ZOLOFT) 50 MG tablet Take 50 mg by mouth daily      fenofibrate (TRICOR) 145 MG tablet Take 145 mg by mouth daily      dicyclomine (BENTYL) 10 MG capsule Take 10 mg by mouth 4 times daily (before meals and nightly)      ondansetron (ZOFRAN) 8 MG tablet Take 8 mg by mouth every 8 hours as needed for Nausea or Vomiting      Cetirizine HCl (ZYRTEC PO) Take 75 mg by mouth as needed Per patient      UNABLE TO FIND Take 4 mg by mouth every 8 hours as needed tinzanidine      MECLIZINE HCL PO Take 8 mg by mouth every 8 hours as needed      metoprolol succinate (TOPROL XL) 25 MG extended release tablet Take 25 mg by mouth daily      levothyroxine (SYNTHROID) 125 MCG tablet Take 125 mcg by mouth Daily      diphenhydrAMINE (BENADRYL) 25 MG tablet Take by mouth daily 2 tab      insulin glargine (LANTUS) 100 UNIT/ML injection vial Inject 35 Units into the skin nightly Takes at 1am      insulin lispro (HUMALOG) 100 UNIT/ML injection vial Inject into the skin 3 times daily (before meals) Sliding scale      LATANOPROST OP Apply to eye daily Both eyes      Multiple Vitamins-Minerals (MULTIVITAMIN ADULT) CHEW Take 2 tablets by mouth daily      Cyanocobalamin (VITAMIN B-12) 1000 MCG SUBL Take by mouth daily gummy      Cholecalciferol (VITAMIN D3) 50 MCG (2000 UT) CHEW Take by mouth daily      FOLIC ACID PO Take 1 mg by mouth daily       clopidogrel (PLAVIX) 75 MG tablet Take 75 mg by mouth daily      aspirin 81 MG EC tablet Take 81 mg by mouth daily      valsartan (DIOVAN) 320 MG tablet Take 320 mg by mouth daily      magnesium oxide (MAG-OX) 400 MG tablet Take 400 mg by mouth daily      medical marijuana Take by mouth as needed.  rosuvastatin (CRESTOR) 20 MG tablet Take 20 mg by mouth daily.  isosorbide mononitrate (IMDUR) 60 MG extended release tablet Take 60 mg by mouth daily.  Pantoprazole Sodium (PROTONIX) 40 MG PACK packet Take 1 packet by mouth daily.          No current facility-administered medications for this visit. Family History   Problem Relation Age of Onset    Diabetes Mother     High Blood Pressure Father     Diabetes Maternal Grandmother     Cancer Paternal Grandmother         ovarian    Colon Cancer Neg Hx     Rectal Cancer Neg Hx     Esophageal Cancer Neg Hx     Stomach Cancer Neg Hx         /78 (Site: Left Upper Arm, Position: Sitting, Cuff Size: Medium Adult)   Pulse 56   Temp 97.8 °F (36.6 °C)   Ht 5' 5\" (1.651 m)   Wt 214 lb (97.1 kg)   SpO2 95% Comment: room air at rest  BMI 35.61 kg/m²   Mallampati airway Class:IV  Neck Circumference:15.5 Inches  Nursing note and vitals reviewed. Constitutional: Patient appears moderately built and moderately nourished. No distress. Patient is oriented to person, place, and time. HENT:   Head: Normocephalic and atraumatic. Right Ear: External ear normal.   Left Ear: External ear normal.   Mouth/Throat: Oropharynx is clear and moist.  No oral thrush. Eyes: Conjunctivae are normal. Pupils are equal, round, and reactive to light. No scleral icterus. Neck: Neck supple. No JVD present. No tracheal deviation present. Cardiovascular: Normal rate, regular rhythm, normal heart sounds. No murmur heard. Pulmonary/Chest: Effort normal and breath sounds normal. No stridor. No respiratory distress. No wheezes. No rales. Patient exhibits no tenderness. Abdominal: Soft. Patient exhibits no distension. No tenderness. Musculoskeletal: Normal range of motion. Extremities: Patient exhibits no edema and no tenderness. Lymphadenopathy:  No cervical adenopathy. Neurological: Patient is alert and oriented to person, place, and time. Skin: Skin is warm and dry. Patient is not diaphoretic. Psychiatric: Patient  has a normal mood and affect.  Patient behavior is normal.         Diagnostic Data:    Split-night sleep study performed on 5 February 2021 at [facility] THE [facility] sleep lab:  Reviewed    Chest vaccine with family physician and take influenza vaccine in coming season with out fail. Patient verbalizes understanding.  -Won Record educated about environmental safety precautions she need to practice to prevent exacerbation ofher Asthma. Won Record verbalizes understanding.   -Patient educated to quit smoking as soon as possible. Patient verbalizes understanding of adverse consequences of tobacco smoking.  -Advised to keep her scheduled follow-up with Dr. Tirso Mujica MD regarding her caronary artery disease.  -She was advised to loose weight by controlling diet and doing exercise once cleared by her Cardiologist   -Won Del Castillo was advised  to keep her scheduled follow up at [facility] for pulmonary disease) sleep clinic with Ms. Emmanuel Galvin CNP for further evaluation and management of sleep apnea with down load. -Schedule patient for low dose CT scan of chest with out IV contrast as recommended by the  U.S. Preventive Services Task Force and the NCCN for lung Cancer Screening in 8months from now I.e After March/2023.  -Schedule patient for follow up with my clinic in 8months for clinical reevaluation with a low-dose CT scan of chest without IV contrast before clinic visit. Patient advised to make early appointment if needed.  -Won Record educated about my impression and plan. Patient verbalizes understanding.      -She is currently following with Dr. Herberth Chu surgeon at [facility] for further evaluation of ascending and descending thoracic aortic aneurysms, the ascending aorta measures up to 4.2 cm at the level the right pulmonary artery.  -I personally reviewed updated the Past medical hx, Past surgical hx,Social hx, Family hx, Medications, Allergies in the discrete data section of the patient chart along with labs, Pulmonary medicine,Sleep medicine related, Pathological, Microbiological and Radiological investigations.      Low Dose CT (LDCT) Lung Screening criteria require invasive procedures. Over diagnosis risk - 10% to 12% of screen-detected lung cancer cases are over diagnosed--that is, the cancer would not have been detected in the patient's lifetime without the screening. Need for follow up screens annually to continue lung cancer screening effectiveness     Risks associated with radiation from annual LDCT- Radiation exposure is about the same as for a mammogram, which is about 1/3 of the annual background radiation exposure from everyday life. Starting screening at age 54 is not likely to increase cancer risk from radiation exposure. Patients with comorbidities resulting in life expectancy of < 10 years, or that would preclude treatment of an abnormality identified on CT, should not be screened due to lack of benefit.     To obtain maximal benefit from this screening, smoking cessation and long-term abstinence from smoking is critical

## 2022-08-04 ENCOUNTER — OFFICE VISIT (OUTPATIENT)
Dept: PULMONOLOGY | Age: 67
End: 2022-08-04
Payer: MEDICARE

## 2022-08-04 VITALS
TEMPERATURE: 97.8 F | HEART RATE: 56 BPM | WEIGHT: 214 LBS | DIASTOLIC BLOOD PRESSURE: 78 MMHG | SYSTOLIC BLOOD PRESSURE: 138 MMHG | BODY MASS INDEX: 35.65 KG/M2 | HEIGHT: 65 IN | OXYGEN SATURATION: 95 %

## 2022-08-04 DIAGNOSIS — J45.30 MILD PERSISTENT ASTHMA WITHOUT COMPLICATION: Primary | ICD-10-CM

## 2022-08-04 DIAGNOSIS — Z87.891 PERSONAL HISTORY OF TOBACCO USE: ICD-10-CM

## 2022-08-04 DIAGNOSIS — J45.20 MILD INTERMITTENT ASTHMA WITHOUT COMPLICATION: ICD-10-CM

## 2022-08-04 DIAGNOSIS — Z87.891 PERSONAL HISTORY OF TOBACCO USE, PRESENTING HAZARDS TO HEALTH: ICD-10-CM

## 2022-08-04 PROCEDURE — 1090F PRES/ABSN URINE INCON ASSESS: CPT | Performed by: INTERNAL MEDICINE

## 2022-08-04 PROCEDURE — G8417 CALC BMI ABV UP PARAM F/U: HCPCS | Performed by: INTERNAL MEDICINE

## 2022-08-04 PROCEDURE — 99214 OFFICE O/P EST MOD 30 MIN: CPT | Performed by: INTERNAL MEDICINE

## 2022-08-04 PROCEDURE — G8427 DOCREV CUR MEDS BY ELIG CLIN: HCPCS | Performed by: INTERNAL MEDICINE

## 2022-08-04 PROCEDURE — 3017F COLORECTAL CA SCREEN DOC REV: CPT | Performed by: INTERNAL MEDICINE

## 2022-08-04 PROCEDURE — G8400 PT W/DXA NO RESULTS DOC: HCPCS | Performed by: INTERNAL MEDICINE

## 2022-08-04 PROCEDURE — 1123F ACP DISCUSS/DSCN MKR DOCD: CPT | Performed by: INTERNAL MEDICINE

## 2022-08-04 PROCEDURE — G0296 VISIT TO DETERM LDCT ELIG: HCPCS | Performed by: INTERNAL MEDICINE

## 2022-08-04 PROCEDURE — 4004F PT TOBACCO SCREEN RCVD TLK: CPT | Performed by: INTERNAL MEDICINE

## 2022-08-04 RX ORDER — FLUTICASONE PROPIONATE 110 UG/1
2 AEROSOL, METERED RESPIRATORY (INHALATION) 2 TIMES DAILY
Qty: 3 EACH | Refills: 3 | Status: SHIPPED | OUTPATIENT
Start: 2022-08-04 | End: 2023-08-04

## 2022-08-04 RX ORDER — ALBUTEROL SULFATE 90 UG/1
2 AEROSOL, METERED RESPIRATORY (INHALATION) EVERY 6 HOURS PRN
Qty: 3 EACH | Refills: 3 | Status: SHIPPED | OUTPATIENT
Start: 2022-08-04 | End: 2023-08-04

## 2022-08-04 NOTE — PROGRESS NOTES
Neck Circumference -   18  Mallampati - 4    Lung Nodule Screening     [] Qualifies    [] Does not qualify   [] Declined    [] Completed

## 2022-08-04 NOTE — PATIENT INSTRUCTIONS
Recommendations/Plan:  -Restart Flovent HFA 110mcg/INH, 2INH BID-refills given in mail-in format for 1 year  -Continue Albuterol HFA 90mcg/Spray MDI, 2puffs  Q6Hprn. Refills given in mail-in format for 1 year  -Patient educated to update his pneumococcal vaccine with family physician and take influenza vaccine in coming season with out fail. Patient verbalizes understanding.  -Sarah Balbuena educated about environmental safety precautions she need to practice to prevent exacerbation ofher Asthma. Sarah Balbuena verbalizes understanding.   -Patient educated to quit smoking as soon as possible. Patient verbalizes understanding of adverse consequences of tobacco smoking.  -Advised to keep her scheduled follow-up with Dr. Grupo Love MD regarding her caronary artery disease.  -She was advised to loose weight by controlling diet and doing exercise once cleared by her Cardiologist   -Sarah Balbuena was advised  to keep her scheduled follow up at [facility] for pulmonary disease) sleep clinic with Ms. Elen Cmapos CNP for further evaluation and management of sleep apnea with down load. -Schedule patient for low dose CT scan of chest with out IV contrast as recommended by the  U.S. Preventive Services Task Force and the NCCN for lung Cancer Screening in 8months from now I.e After March/2023.  -Schedule patient for follow up with my clinic in 8months for clinical reevaluation with a low-dose CT scan of chest without IV contrast before clinic visit. Patient advised to make early appointment if needed.  -Sarah Balbuena educated about my impression and plan. Patient verbalizes understanding. What is lung cancer screening? Lung cancer screening is a way in which doctors check the lungs for early signs of cancer in people who have no symptoms of lung cancer. A low-dose CT scan uses much less radiation than a normal CT scan and shows a more detailed image of the lungs than a standard X-ray.   The goal of lung cancer screening is to find cancer early, before it has a chance to grow, spread, or cause problems. One large study found that smokers who were screened with low-dose CT scans were less likely to die of lung cancer than those who were screened with standard X-ray. Below is a summary of the things you need to know regarding screening for lung cancer with low-dose computed tomography (LDCT). This is a screening program that involves routine annual screening with LDCT studies of the lung. The LDCTs are done using low-dose radiation that is not thought to increase your cancer risk. If you have other serious medical conditions (other cancers, congestive heart failure) that limit your life expectancy to less than 10 years, you should not undergo lung cancer screening with LDCT. The chance is 20%-60% that the LDCT result will show abnormalities. This would require additional testing which could include repeat imaging or even invasive procedures. Most (about 95%) of \"abnormal\" LDCT results are false in the sense that no lung cancer is ultimately found. Additionally, some (about 10%) of the cancers found would not affect your life expectancy, even if undetected and untreated. If you are still smoking, the single most important thing that you can do to reduce your risk of dying of lung cancer is to quit. For this screening to be covered by Medicare and most other insurers, strict criteria must be met. If you do not meet these criteria, but still wish to undergo LDCT testing, you will be required to sign a waiver indicating your willingness to pay for the scan. What is lung cancer screening? Lung cancer screening is a way in which doctors check the lungs for early signs of cancer in people who have no symptoms of lung cancer. A low-dose CT scan uses much less radiation than a normal CT scan and shows a more detailed image of the lungs than a standard X-ray.   The goal of lung cancer screening is to find cancer early, before it has a chance to grow, spread, or cause problems. One large study found that smokers who were screened with low-dose CT scans were less likely to die of lung cancer than those who were screened with standard X-ray. Below is a summary of the things you need to know regarding screening for lung cancer with low-dose computed tomography (LDCT). This is a screening program that involves routine annual screening with LDCT studies of the lung. The LDCTs are done using low-dose radiation that is not thought to increase your cancer risk. If you have other serious medical conditions (other cancers, congestive heart failure) that limit your life expectancy to less than 10 years, you should not undergo lung cancer screening with LDCT. The chance is 20%-60% that the LDCT result will show abnormalities. This would require additional testing which could include repeat imaging or even invasive procedures. Most (about 95%) of \"abnormal\" LDCT results are false in the sense that no lung cancer is ultimately found. Additionally, some (about 10%) of the cancers found would not affect your life expectancy, even if undetected and untreated. If you are still smoking, the single most important thing that you can do to reduce your risk of dying of lung cancer is to quit. For this screening to be covered by Medicare and most other insurers, strict criteria must be met. If you do not meet these criteria, but still wish to undergo LDCT testing, you will be required to sign a waiver indicating your willingness to pay for the scan.

## 2023-04-20 ENCOUNTER — OFFICE VISIT (OUTPATIENT)
Dept: PULMONOLOGY | Age: 68
End: 2023-04-20
Payer: MEDICARE

## 2023-04-20 VITALS
BODY MASS INDEX: 34.66 KG/M2 | OXYGEN SATURATION: 97 % | TEMPERATURE: 98 F | HEIGHT: 65 IN | HEART RATE: 79 BPM | WEIGHT: 208 LBS | SYSTOLIC BLOOD PRESSURE: 118 MMHG | DIASTOLIC BLOOD PRESSURE: 66 MMHG

## 2023-04-20 DIAGNOSIS — Z87.891 PERSONAL HISTORY OF TOBACCO USE, PRESENTING HAZARDS TO HEALTH: ICD-10-CM

## 2023-04-20 DIAGNOSIS — J45.30 MILD PERSISTENT ASTHMA WITHOUT COMPLICATION: ICD-10-CM

## 2023-04-20 DIAGNOSIS — Z87.891 PERSONAL HISTORY OF TOBACCO USE, PRESENTING HAZARDS TO HEALTH: Primary | ICD-10-CM

## 2023-04-20 DIAGNOSIS — Z87.891 PERSONAL HISTORY OF TOBACCO USE: ICD-10-CM

## 2023-04-20 PROCEDURE — 1090F PRES/ABSN URINE INCON ASSESS: CPT | Performed by: INTERNAL MEDICINE

## 2023-04-20 PROCEDURE — G8400 PT W/DXA NO RESULTS DOC: HCPCS | Performed by: INTERNAL MEDICINE

## 2023-04-20 PROCEDURE — 99214 OFFICE O/P EST MOD 30 MIN: CPT | Performed by: INTERNAL MEDICINE

## 2023-04-20 PROCEDURE — 1123F ACP DISCUSS/DSCN MKR DOCD: CPT | Performed by: INTERNAL MEDICINE

## 2023-04-20 PROCEDURE — 4004F PT TOBACCO SCREEN RCVD TLK: CPT | Performed by: INTERNAL MEDICINE

## 2023-04-20 PROCEDURE — G0296 VISIT TO DETERM LDCT ELIG: HCPCS | Performed by: INTERNAL MEDICINE

## 2023-04-20 PROCEDURE — G8417 CALC BMI ABV UP PARAM F/U: HCPCS | Performed by: INTERNAL MEDICINE

## 2023-04-20 PROCEDURE — 3017F COLORECTAL CA SCREEN DOC REV: CPT | Performed by: INTERNAL MEDICINE

## 2023-04-20 PROCEDURE — G8427 DOCREV CUR MEDS BY ELIG CLIN: HCPCS | Performed by: INTERNAL MEDICINE

## 2023-04-20 RX ORDER — POTASSIUM CHLORIDE 1.5 G/1.77G
20 POWDER, FOR SOLUTION ORAL 2 TIMES DAILY
COMMUNITY

## 2023-04-20 RX ORDER — ALBUTEROL SULFATE 90 UG/1
2 AEROSOL, METERED RESPIRATORY (INHALATION) EVERY 6 HOURS PRN
Qty: 1 EACH | Refills: 11 | Status: SHIPPED | OUTPATIENT
Start: 2023-04-20 | End: 2024-04-19

## 2023-04-20 NOTE — PROGRESS NOTES
Neck Circumference -   16  Mallampati - 4    Lung Nodule Screening     [] Qualifies    [] Does not qualify   [] Declined    [] Completed

## 2023-04-20 NOTE — PATIENT INSTRUCTIONS
screening for lung cancer? Lung cancer screening is a way to find some lung cancers early, before a person has any symptoms of the cancer. Lung cancer screening may help those who have the highest risk for lung cancer--people age 48 and older who are or were heavy smokers. For most people, who aren't at increased risk, screening for lung cancer probably isn't helpful. Screening won't prevent cancer. And it may not find all lung cancers. Lung cancer screening may lower the risk of dying from lung cancer in a small number of people. How is it done? Lung cancer screening is done with a low-dose CT (computed tomography) scan. A CT scan uses X-rays, or radiation, to make detailed pictures of your body. Experts recommend that screening be done in medical centers that focus on finding and treating lung cancer. Who is screening recommended for? Lung cancer screening is recommended for people age 48 and older who are or were heavy smokers. That means people with a smoking history of at least 20 pack years. A pack year is a way to measure how heavy a smoker you are or were. To figure out your pack years, multiply how many packs a day on average (assuming 20 cigarettes per pack) you have smoked by how many years you have smoked. For example: If you smoked 1 pack a day for 20 years, that's 1 times 20. So you have a smoking history of 20 pack years. If you smoked 2 packs a day for 10 years, that's 2 times 10. So you have a smoking history of 20 pack years. Experts agree that screening is for people who have a high risk of lung cancer. But experts don't agree on what high risk means. Some say people age 48 or older with at least a 20-pack-year smoking history are high risk. Others say it's people age 54 or older with a 30-pack-year history. To see if you could benefit from screening, first find out if you are at high risk for lung cancer. Your doctor can help you decide your lung cancer risk.   What are the risks of

## 2023-05-17 ENCOUNTER — OFFICE VISIT (OUTPATIENT)
Dept: PULMONOLOGY | Age: 68
End: 2023-05-17
Payer: OTHER GOVERNMENT

## 2023-05-17 VITALS
SYSTOLIC BLOOD PRESSURE: 126 MMHG | BODY MASS INDEX: 34.61 KG/M2 | HEART RATE: 58 BPM | DIASTOLIC BLOOD PRESSURE: 64 MMHG | OXYGEN SATURATION: 93 % | TEMPERATURE: 97.9 F | HEIGHT: 65 IN

## 2023-05-17 DIAGNOSIS — G47.33 OSA TREATED WITH BIPAP: Primary | ICD-10-CM

## 2023-05-17 DIAGNOSIS — I73.9 PVD (PERIPHERAL VASCULAR DISEASE) (HCC): ICD-10-CM

## 2023-05-17 DIAGNOSIS — J45.991 COUGH VARIANT ASTHMA: ICD-10-CM

## 2023-05-17 PROBLEM — F17.200 TOBACCO DEPENDENCE SYNDROME: Status: ACTIVE | Noted: 2023-05-17

## 2023-05-17 PROBLEM — I65.21 STENOSIS OF RIGHT CAROTID ARTERY: Status: ACTIVE | Noted: 2021-07-16

## 2023-05-17 PROBLEM — J44.9 COPD (CHRONIC OBSTRUCTIVE PULMONARY DISEASE) (HCC): Status: ACTIVE | Noted: 2022-08-11

## 2023-05-17 PROBLEM — L97.529 ULCER OF LEFT FOOT DUE TO TYPE 2 DIABETES MELLITUS (HCC): Status: ACTIVE | Noted: 2022-10-10

## 2023-05-17 PROBLEM — Z95.5 PRESENCE OF STENT IN CORONARY ARTERY: Status: ACTIVE | Noted: 2023-05-17

## 2023-05-17 PROBLEM — E11.9 TYPE 2 DIABETES MELLITUS (HCC): Status: ACTIVE | Noted: 2022-10-10

## 2023-05-17 PROBLEM — I63.9 CEREBROVASCULAR ACCIDENT (CVA) (HCC): Status: ACTIVE | Noted: 2023-05-17

## 2023-05-17 PROBLEM — E11.621 ULCER OF LEFT FOOT DUE TO TYPE 2 DIABETES MELLITUS (HCC): Status: ACTIVE | Noted: 2022-10-10

## 2023-05-17 PROBLEM — L03.90 CELLULITIS: Status: ACTIVE | Noted: 2023-05-17

## 2023-05-17 PROBLEM — I25.810 ATHEROSCLEROSIS OF CORONARY ARTERY BYPASS GRAFT(S) WITHOUT ANGINA PECTORIS: Status: ACTIVE | Noted: 2022-11-04

## 2023-05-17 PROCEDURE — 1123F ACP DISCUSS/DSCN MKR DOCD: CPT | Performed by: NURSE PRACTITIONER

## 2023-05-17 PROCEDURE — 3017F COLORECTAL CA SCREEN DOC REV: CPT | Performed by: NURSE PRACTITIONER

## 2023-05-17 PROCEDURE — G8400 PT W/DXA NO RESULTS DOC: HCPCS | Performed by: NURSE PRACTITIONER

## 2023-05-17 PROCEDURE — 1090F PRES/ABSN URINE INCON ASSESS: CPT | Performed by: NURSE PRACTITIONER

## 2023-05-17 PROCEDURE — G8417 CALC BMI ABV UP PARAM F/U: HCPCS | Performed by: NURSE PRACTITIONER

## 2023-05-17 PROCEDURE — 4004F PT TOBACCO SCREEN RCVD TLK: CPT | Performed by: NURSE PRACTITIONER

## 2023-05-17 PROCEDURE — G8427 DOCREV CUR MEDS BY ELIG CLIN: HCPCS | Performed by: NURSE PRACTITIONER

## 2023-05-17 PROCEDURE — 99214 OFFICE O/P EST MOD 30 MIN: CPT | Performed by: NURSE PRACTITIONER

## 2023-05-17 RX ORDER — DULAGLUTIDE 0.75 MG/.5ML
INJECTION, SOLUTION SUBCUTANEOUS
COMMUNITY
Start: 2023-05-04

## 2023-05-17 ASSESSMENT — ENCOUNTER SYMPTOMS
COUGH: 0
WHEEZING: 0
VOMITING: 0
SHORTNESS OF BREATH: 0
DIARRHEA: 0
NAUSEA: 0
EYES NEGATIVE: 1
ABDOMINAL PAIN: 0

## 2023-05-17 NOTE — PROGRESS NOTES
[place] [facility]      Liliam Park         [id number]  5/17/2023   Chief Complaint   Patient presents with    Follow-up     1 year maya        Pt of Dr. Mamta Graham     PAP Download:   Original or initial AHI: n/a     Date of initial study: 1/10/2007     Compliant  97%     Noncompliant 3 %     PAP Type Spont Level  17/11   Avg Hrs/Day 10  AHI: 6.6 (no days of detailed)   Leaks : 95 th percentile: 35.3   Recorded compliance dates 4/18/23-5/17/23  Machine/Mfg:   [x] ResMed    [] Respironics/Dreamstation   Interface:   [] Nasal    [] Nasal pillows   [x] FFM      Provider:      [] [facility]     [x]Blaise     [] Nita    [] Denver Deist    [] [place]               [] [facility]      [] Adaptive    [] [address]      [] Other    Neck Size: 16.25  Mallampati 4  ESS:  8  SAQLI:35     Here is a scan of the most recent download:                  Presentation:   Lisette Calvo presents for 1 yearsle medicine follow up for obstructive sleep apnea  Since the last visit, Lisette Calvo using her BiPAP with good compliance , AHI still elevated, slightly improved. Long sleeper 10-12 hours per night. Denies any issues       Progress History:   Since last visit any new medical issues? No  Any trouble with Machine No  Any new sleep medicines? No    Equipment issues: The pressure is  acceptable, the mask is acceptable     Review of Systems -   Review of Systems   Constitutional:  Positive for fatigue. Negative for activity change, appetite change, chills, fever and unexpected weight change. HENT: Negative. Eyes: Negative. Respiratory:  Negative for cough, shortness of breath and wheezing. Cardiovascular:  Negative for chest pain, palpitations and leg swelling. Gastrointestinal:  Negative for abdominal pain, diarrhea, nausea and vomiting. Genitourinary: Negative. Musculoskeletal: Negative. Skin: Negative. Neurological: Negative. Hematological: Negative.     Psychiatric/Behavioral:

## (undated) DEVICE — COTTON BALL ST

## (undated) DEVICE — GARMENT,MEDLINE,DVT,INT,CALF,MED, GEN2: Brand: MEDLINE

## (undated) DEVICE — DRAPE,U/SHT,SPLIT,FILM,60X84,STERILE: Brand: MEDLINE

## (undated) DEVICE — CHLORAPREP 26ML CLEAR

## (undated) DEVICE — DRAPE,EXTREMITY,89X128,STERILE: Brand: MEDLINE

## (undated) DEVICE — SHOE POSTOP M WOMAN 6-8 UNIV FOAM TRICOT SEMI FLX SKID

## (undated) DEVICE — GOWN,SIRUS,NON REINFRCD,LARGE,SET IN SL: Brand: MEDLINE

## (undated) DEVICE — GLOVE ORANGE PI 7   MSG9070

## (undated) DEVICE — PACK PROCEDURE SURG PLAS SC MIN SRHP LF

## (undated) DEVICE — GLOVE SURG SZ 8 L11.77IN FNGR THK9.8MIL STRW LTX POLYMER

## (undated) DEVICE — SUTURE ETHLN SZ 4-0 L18IN NONABSORBABLE BLK L16MM PS-4 1/2 1662G